# Patient Record
Sex: FEMALE | Race: OTHER | Employment: OTHER | URBAN - METROPOLITAN AREA
[De-identification: names, ages, dates, MRNs, and addresses within clinical notes are randomized per-mention and may not be internally consistent; named-entity substitution may affect disease eponyms.]

---

## 2017-07-18 ENCOUNTER — OFFICE VISIT (OUTPATIENT)
Dept: OBGYN CLINIC | Age: 37
End: 2017-07-18

## 2017-07-18 VITALS
DIASTOLIC BLOOD PRESSURE: 69 MMHG | BODY MASS INDEX: 30.7 KG/M2 | WEIGHT: 191 LBS | HEIGHT: 66 IN | SYSTOLIC BLOOD PRESSURE: 117 MMHG

## 2017-07-18 DIAGNOSIS — Z3A.08 8 WEEKS GESTATION OF PREGNANCY: ICD-10-CM

## 2017-07-18 DIAGNOSIS — O30.001 TWIN GESTATION IN FIRST TRIMESTER, UNSPECIFIED MULTIPLE GESTATION TYPE: ICD-10-CM

## 2017-07-18 DIAGNOSIS — N92.6 MISSED MENSES: ICD-10-CM

## 2017-07-18 DIAGNOSIS — O09.521 AMA (ADVANCED MATERNAL AGE) MULTIGRAVIDA 35+, FIRST TRIMESTER: ICD-10-CM

## 2017-07-18 DIAGNOSIS — Z13.79 GENETIC TESTING: ICD-10-CM

## 2017-07-18 DIAGNOSIS — Z32.01 POSITIVE PREGNANCY TEST: Primary | ICD-10-CM

## 2017-07-18 LAB
ANTIBODY SCREEN, EXTERNAL: NEGATIVE
CHLAMYDIA, EXTERNAL: NEGATIVE
CYSTIC FIBROSIS, EXTERNAL: NEGATIVE
HBSAG, EXTERNAL: NEGATIVE
HCT, EXTERNAL: 40.7
HGB EVAL, EXTERNAL: NEGATIVE
HGB, EXTERNAL: 13.4
HIV, EXTERNAL: NEGATIVE
N. GONORRHEA, EXTERNAL: NEGATIVE
PAP SMEAR, EXTERNAL: NORMAL
PLATELET CNT,   EXTERNAL: 332
RUBELLA, EXTERNAL: NORMAL
T. PALLIDUM, EXTERNAL: NEGATIVE
TYPE, ABO & RH, EXTERNAL: NORMAL
URINALYSIS, EXTERNAL: NEGATIVE

## 2017-07-18 RX ORDER — FOLIC ACID 1 MG/1
2 TABLET ORAL DAILY
Qty: 60 TAB | Refills: 6 | Status: SHIPPED | OUTPATIENT
Start: 2017-07-18 | End: 2017-08-08 | Stop reason: SDUPTHER

## 2017-07-18 RX ORDER — ONDANSETRON 4 MG/1
4 TABLET, ORALLY DISINTEGRATING ORAL
Qty: 30 TAB | Refills: 1 | Status: SHIPPED | OUTPATIENT
Start: 2017-07-18 | End: 2017-08-22

## 2017-07-18 RX ORDER — PROMETHAZINE HYDROCHLORIDE 25 MG/1
25 TABLET ORAL
Qty: 30 TAB | Refills: 0 | Status: SHIPPED | OUTPATIENT
Start: 2017-07-18 | End: 2017-09-19

## 2017-07-18 RX ORDER — DOXYLAMINE SUCCINATE AND PYRIDOXINE HYDROCHLORIDE, DELAYED RELEASE TABLETS 10 MG/10 MG 10; 10 MG/1; MG/1
1 TABLET, DELAYED RELEASE ORAL 3 TIMES DAILY
Qty: 120 TAB | Refills: 2 | Status: SHIPPED | OUTPATIENT
Start: 2017-07-18 | End: 2017-08-22

## 2017-07-18 NOTE — MR AVS SNAPSHOT
Visit Information Date & Time Provider Department Dept. Phone Encounter #  
 7/18/2017  9:00  S Milton Garcia, Dede Bridges [phone] [id number] Upcoming Health Maintenance Date Due  
 PAP AKA CERVICAL CYTOLOGY 5/18/2000 INFLUENZA AGE 9 TO ADULT 8/1/2017 Allergies as of 7/18/2017  Review Complete On: 7/18/2017 By: Melchor Castaneda No Known Allergies Current Immunizations  Never Reviewed No immunizations on file. Not reviewed this visit You Were Diagnosed With   
  
 Codes Comments Positive pregnancy test    -  Primary ICD-10-CM: Z32.01 
ICD-9-CM: V72.42 Missed menses     ICD-10-CM: N92.6 ICD-9-CM: 626.4 AMA (advanced maternal age) multigravida 33+, first trimester     ICD-10-CM: O09.521 ICD-9-CM: 535.25 Genetic testing     ICD-10-CM: Z13.79 ICD-9-CM: V82.79 Vitals BP Height(growth percentile) Weight(growth percentile) LMP BMI OB Status 117/69 5' 6\" (1.676 m) 191 lb (86.6 kg) 05/20/2017 (Approximate) 30.83 kg/m2 Pregnant Smoking Status Former Smoker BMI and BSA Data Body Mass Index Body Surface Area  
 30.83 kg/m 2 2.01 m 2 Your Updated Medication List  
  
   
This list is accurate as of: 7/18/17  9:51 AM.  Always use your most recent med list.  
  
  
  
  
 PRENATAL DHA+COMPLETE PRENATAL -300 mg-mcg-mg Cmpk Generic drug:  [id number] zakiya-folic-dha Take  by mouth. Patient Instructions Learning About Twin Pregnancy What is different about a twin pregnancy? In many ways, a twin pregnancy is like a single-baby pregnancy. Healthy twins develop like a single baby does until the last trimester, when their growth slows down. Twins are usually born before the usual 40-week due date. For the mother, carrying twins can be more difficult than carrying a single baby. And her risks are higher for pregnancy problems.  That's why keeping up with prenatal checks and tests is especially important. How do twins grow? Twins develop from embryos to babies like a single baby does. · By weeks 10 to 14 of your pregnancy, one or two placentas have formed inside your uterus. It is possible to hear your babies' heartbeats with a special ultrasound device. Your babies' eyes can move. Their arms and legs can bend. · Around weeks 14 to 18, hair is beginning to grow on your babies' heads. · By 18 to 22 weeks, your babies can now suck their thumbs and  firmly with their hands. They can open and close their eyelids. Around this time, you may start to feel your babies move. At first, this might feel like fluttering or \"butterflies. \" 
· Around week 26, you may notice that your babies respond to the sound of your or your partner's voice. You may also notice that they do less turning and twisting and more squirming. · Up to 32 weeks, twins grow rapidly. By this point, they really start to look like babies, with hair and plump skin. · Around 32 to 34 weeks, twins' pace of growth slows down. Their lungs become more ready for breathing. This marks a stage when babies born early are less likely to have breathing problems after birth. What can you expect during a twin pregnancy? With a twin pregnancy, your body makes high levels of pregnancy hormones. So morning sickness may come on earlier and stronger than if you were carrying a single baby. You may also have earlier and more intense symptoms from pregnancy, like swelling, heartburn, leg cramps, bladder discomfort, and sleep problems. Your belly may grow bigger, and you may gain more weight, sooner. Talk to your doctor about how much you might expect to gain. When you're carrying twins, you and your babies may be tested and checked more than you would for a single-baby pregnancy.  
· At about 10 weeks, an ultrasound can show if the babies are growing in the same amniotic sac. If they each have their own sac and their own placenta, twins have the best chances of both growing well. · Between weeks 18 and 20, an ultrasound may be able to show the sex of your babies. · Later in your pregnancy, you will start to have checkups more often. This will be sooner than for a single-baby pregnancy. Twins tend to be born early, but 37 weeks is a goal when mother and babies are doing well. As you get closer to delivery time, your medical team will help you know what to expect and what to do. As questions come to mind, keep a list so you can remember to ask your doctor. Follow-up care is a key part of your treatment and safety. Be sure to make and go to all appointments, and call your doctor if you are having problems. It's also a good idea to know your test results and keep a list of the medicines you take. Where can you learn more? Go to http://kassidy-yinka.info/. Enter H410 in the search box to learn more about \"Learning About Twin Pregnancy. \" Current as of: May 30, 2016 Content Version: 11.3 © 8785-4130 [employer]. Care instructions adapted under license by [employer] (which disclaims liability or warranty for this information). If you have questions about a medical condition or this instruction, always ask your healthcare professional. [place] [address] any warranty or liability for your use of this information. Advanced Maternal Age: Care Instructions Your Care Instructions Advanced maternal age is the medical term for pregnancy in a woman who will be 28 or older on her due date. Most women this age have healthy babies. But a pregnancy at this age has a greater risk for problems than a pregnancy at a younger age. These include  birth and preeclampsia. They also include gestational diabetes, problems with the placenta, and genetic problems. Most of these problems can't be prevented. But it's best to catch any problems early. This is why your doctor will want to check you for diabetes. He or she will also check your blood pressure and urine at every visit. High blood pressure and protein in urine are signs of preeclampsia. You can decide if you want to have tests to find out if your fetus has certain genetic problems, such as Down syndrome. A fetus is the medical term for a baby before birth. There are many things you can't control about pregnancy. But there is a lot you can do to help you have a healthy pregnancy. Do your best to eat well. And try to get plenty of exercise and rest. 
Follow-up care is a key part of your treatment and safety. Be sure to make and go to all appointments, and call your doctor if you are having problems. It's also a good idea to know your test results and keep a list of the medicines you take. How can you care for yourself at home? · Talk with your doctor about prenatal screening tests. These can help find Down syndrome and other possible problems. · Eat a balanced diet with plenty of protein, calcium, and iron. Be sure to include fruits, vegetables, and whole grains. · Talk to your doctor about an exercise plan. Many pregnant women enjoy walking, swimming, and prenatal yoga. · Make sure that you get enough folic acid. Folic acid helps prevent some birth defects, especially if you take it before you get pregnant. Your doctor will tell you how much you need. You can take folic acid pills. · Take your prenatal vitamins. · Drink plenty of fluids, enough so that your urine is light yellow or clear like water. Dehydration can lead to contractions. If you have kidney, heart, or liver disease and have to limit fluids, talk with your doctor before you increase the amount of fluids you drink.  
· Check with your doctor or pharmacist before you take any over-the-counter medicines, vitamins, herbal supplements, or home remedies. · Do not drink alcohol. No amount of alcohol has been found to be safe during pregnancy. · Do not smoke. If you need help quitting, talk to your doctor about stop-smoking programs and medicines. These can increase your chances of quitting for good. When should you call for help? Call 911 anytime you think you may need emergency care. For example, call if: 
· You passed out (lost consciousness). · You have sudden, severe pain in your belly. · You have had fluid gushing or leaking from your vagina and you know or think the umbilical cord is bulging into your vagina. If this happens, immediately get down on your knees so your rear end (buttocks) is higher than your head. This will decrease the pressure on the cord until help arrives. Call your doctor now or seek immediate medical care if: 
· You have signs of preeclampsia, such as: 
¨ Sudden swelling of your face, hands, or feet. ¨ New vision problems (such as dimness or blurring). ¨ A severe headache. · You have any vaginal bleeding. · You have belly pain or cramping. · You have a fever. · You have had regular contractions (with or without pain) for an hour. This means that you have 8 or more within 1 hour or 4 or more in 20 minutes after you change your position and drink fluids. · You have a sudden release of fluid from the vagina. · You have low back pain or pelvic pressure that does not go away. · You notice that your baby has stopped moving or is moving much less than normal. 
· You have vaginal discharge that smells bad. · You have severe vomiting with pain or fever, you vomit 3 or more times a day, or you vomit for more than 1 hour each day. Watch closely for changes in your health, and be sure to contact your doctor if you have any problems. Where can you learn more? Go to http://kassidy-yinka.info/. Enter C333 in the search box to learn more about \"Advanced Maternal Age: Care Instructions. \" 
 Current as of: June 14, 2016 Content Version: 11.3 © 5036-1919 [employer]. Care instructions adapted under license by [employer] (which disclaims liability or warranty for this information). If you have questions about a medical condition or this instruction, always ask your healthcare professional. [address] any warranty or liability for your use of this information. Introducing [facility]! Flaca Traylor introduces [employer] patient portal. Now you can access parts of your medical record, email your doctor's office, and request medication refills online. 1. In your internet browser, go to [email or web address] 2. Click on the First Time User? Click Here link in the Sign In box. You will see the New Member Sign Up page. 3. Enter your [employer] Access Code exactly as it appears below. You will not need to use this code after youve completed the sign-up process. If you do not sign up before the expiration date, you must request a new code. · [employer] Access Code: [email or web address] Expires: 10/16/2017  9:51 AM 
 
4. Enter the last four digits of your Social Security Number (xxxx) and Date of Birth (mm/dd/yyyy) as indicated and click Submit. You will be taken to the next sign-up page. 5. Create a [employer] ID. This will be your [employer] login ID and cannot be changed, so think of one that is secure and easy to remember. 6. Create a [employer] password. You can change your password at any time. 7. Enter your Password Reset Question and Answer. This can be used at a later time if you forget your password. 8. Enter your e-mail address. You will receive e-mail notification when new information is available in [address] 9. Click Sign Up. You can now view and download portions of your medical record. 10. Click the Download Summary menu link to download a portable copy of your medical information. If you have questions, please visit the Frequently Asked Questions section of the [employer] website. Remember, [employer] is NOT to be used for urgent needs. For medical emergencies, dial 911. Now available from your iPhone and Android! Please provide this summary of care documentation to your next provider. If you have any questions after today's visit, please call [phone].

## 2017-07-18 NOTE — PROGRESS NOTES
Current pregnancy history: (New Patient)     Iraida Sandhu is a G2 [id number],  44 y.o. female OTHER Patient's last menstrual period was 05/20/2017 (approximate). She presents for the evaluation of amenorrhea and a positive pregnancy test. Reports using Plan B on 6/3/18, day after having intercourse. LMP history:  The date of her LMP is certain. Her last menstrual period was normal and lasted for 4 to 5 days. A urine pregnancy test was positive on 6/19/17. She was not on the pill at conception, but did take Plan B day after IC. Based on her LMP, her EDC is 2/14/18 and her EGA is 8 weeks,3 days. Her menstrual cycles are regular and occur approximately every 28 days  and range from 3 to 5 days. The last menses lasted 7 the usual number of days. Ultrasound data:  She had an  ultrasound done by the ultrasound tech today which revealed a viable twin pregnancy with a gestational age of 11 weeks and 2 days giving an [address] of 2/25/18. Fetus: A  TA ULTRASOUND PERFORMED. A VIABLE TWIN 8W3D IUP IS SEEN WITH NORMAL CARDIAC RHYTHM ON AAA AND BBB. AMNIONICITY  AND CHORONICITY ARE UNSURE. MONO/MONO ARE A POSSIBILITY WITH ONE YS VISUALIZED. GESTATIONAL AGE BASED ON TODAYS LMP. A N[facility]AL [facility] IS SE[facility]. RIGHT & LEFT ADNEXA APPEAR WITHIN NORMAL LIMITS. NO FREE FLUID SEEN IN THE CDS. Fetus: B  TA ULTRASOUND PERFORMED. A VIABLE TWIN 8W5D IUP IS SEEN WITH NORMAL CARDIAC RHYTHM. GESTATIONAL AGE BASED ON TODAYS US.  A NORMAL APPEARING [facility] IS SEEN. RIGHT & LEFT OVARIES APPEAR WITHIN NORMAL LIMITS. NO FREE FLUID SEEN IN THE CDS. Pregnancy symptoms:    Since her LMP she has experienced  urinary frequency, breast tenderness, and nausea. She has been vomiting over the last few weeks. Associated signs and symptoms which she denies: dysuria, discharge, vaginal bleeding. She states she has gained weight:  Approximately 6 pounds over the last few weeks.     Relevant past pregnancy history:   She has the following pregnancy history:  1st pregnancy - placenta previa, delivered by C/S @ 36-37wks. 6#12oz  2nd pregnancy - breech, delivered by C/S @ 37wks. Started bleeding a week prior to delivery. 3rd pregnancy - SAB ~10wks. Did not require D&C. Different FOB. Relevant past medical history:(relevant to this pregnancy): noncontributory. Pap/Occupational history:  Last pap smear: 2015 Results: Normal      Her occupation is: [employer] in [employer]. Substance history: negative for alcohol, tobacco and street drugs. Positive for nothing. Exposure history: There is/are no indoor cats in the home. The patient was instructed to not change the cat litter. She denies close contact with children on a regular basis other than her own. She has had chicken pox or the vaccine in the past.   Patient denies issues with domestic violence. Genetic Screening/Teratology Counseling: (Includes patient, baby's father, or anyone in either family with:)  3.  Patient's age >/= 28 at [facility]?-- 43 yo at delivery  2. Thalassemia ([facility], [facility], [address] [facility], or  background): MCV<80?--no.     3.  Neural tube defect (meningomyelocele, spina bifida, anencephaly)?--no.   4.  Congenital heart defect?--no.  5.  Down syndrome?--no.   6.  Dany-Sachs (Anglican, [place])?--no.   7.  Canavan's Disease?--no.   8.  Familial Dysautonomia?--no.   9.  Sickle cell disease or trait ()? --no   The patient has not been tested for sickle trait  10. Hemophilia or other blood disorders?--no. 11.  Muscular dystrophy?--no. 12.  Cystic fibrosis?--no. 13.  Hunt's Chorea?--no. 14.  Mental retardation/autism (if yes was person tested for Fragile X)?--no. 15.  Other inherited genetic or chromosomal disorder?--no. 12.  Maternal metabolic disorder (DM, PKU, etc)?--no. 17.  Patient or FOB with a child with a birth defect not listed above?--no.  17a.   Patient or FOB with a birth defect themselves?--no. 18.  Recurrent pregnancy loss, or stillbirth?--no. 19.  Any medications since LMP other than prenatal vitamins (include vitamins, supplements, OTC meds, drugs, alcohol)? -- EtOH. [address]  20.  Any other genetic/environmental exposure to discuss?--no. Infection History:  1. Lives with someone with TB or TB exposed?--no.   2.  Patient or partner has history of genital herpes?--no.  3.  Rash or viral illness since LMP?--no.    4.  History of STD (GC, CT, HPV, syphilis, HIV)?-- h/o chlamydia    5. Other: OTHER? Past Medical History:   Diagnosis Date    Abnormal Pap smear of cervix     Only one abn. pap hx. Colpo performed and reports WNL     Hx of chlamydia infection     Routine Papanicolaou smear 2015    WNL per pt. Past Surgical History:   Procedure Laterality Date    HX  SECTION      x 2    HX WISDOM TEETH EXTRACTION       Social History     Occupational History    Not on file. Social History Main Topics    Smoking status: Former Smoker     Quit date:     Smokeless tobacco: Never Used      Comment: Never used vapor or e-cigs     Alcohol use No    Drug use: No    Sexual activity: Yes     Partners: Male     Birth control/ protection: None     Family History   Problem Relation Age of Onset    Liver Disease Father      OB History    Para Term  AB Living   4 2 1 1 1 2   SAB TAB Ectopic Molar Multiple Live Births   1     2      # Outcome Date GA Lbr [place]/2nd Weight Sex Delivery Anes PTL Lv   4 Current            3 SAB 10/2013 12w0d    SAB      2 Term 13 37w0d  8 lb (3.629 kg) M CS-Unspec Spinal N JOSÉ MIGUEL      Birth Comments: Breech    1  11/02/10 36w0d  6 lb (2.722 kg) M CS-Unspec Spinal Y JOSÉ MIGUEL      Complications: Placenta previa        No Known Allergies  Prior to Admission medications    Medication Sig Start Date End Date Taking?  Authorizing Provider   [id number] zakiya-folic-dha (PRENATAL DHA+COMPLETE PRENATAL) -246 mg-mcg-mg cmpk Take  by mouth.    Yes Historical Provider        Review of Systems: History obtained from the patient  Constitutional: negative for weight loss, fever, night sweats  HEENT: negative for hearing loss, earache, congestion, snoring, sore throat  CV: negative for chest pain, palpitations, edema  Resp: negative for cough, shortness of breath, wheezing  Breast: negative for breast lumps, nipple discharge, galactorrhea  GI: negative for change in bowel habits, abdominal pain, black or bloody stools  : negative for dysuria, hematuria, vaginal discharge  MSK: negative for back pain, joint pain, muscle pain  Skin: negative for itching, rash, hives  Neuro: negative for dizziness, headache, confusion, weakness  Psych: negative for anxiety, depression, change in mood  Heme/lymph: negative for bleeding, bruising, pallor    Objective:  Visit Vitals    /69    Ht 5' 6\" (1.676 m)    Wt 191 lb (86.6 kg)    LMP 05/20/2017 (Approximate)    BMI 30.83 kg/m2       Physical Exam:     Constitutional  · Appearance: well-nourished, well developed, alert, in no acute distress    HENT  · Head  · Face: appears normal  · Eyes: appear normal  · Ears: normal  · Mouth: normal  · Lips: no lesions    Neck  · Inspection/Palpation: normal appearance, no masses or tenderness  · Lymph Nodes: no lymphadenopathy present  · Thyroid: gland size normal, nontender, no nodules or masses present on palpation    Chest  · Respiratory Effort: breathing unlabored  · Auscultation: normal breath sounds    Cardiovascular  · Heart:  · Auscultation: regular rate and rhythm without murmur    Breasts  · Inspection of Breasts: breasts symmetrical, no skin changes, no discharge present, nipple appearance normal, no skin retraction present  · Palpation of Breasts and Axillae: no masses present on palpation, no breast tenderness  · Axillary Lymph Nodes: no lymphadenopathy present    Gastrointestinal  · Abdominal Examination: abdomen non-tender to palpation, normal bowel sounds, no masses present  · Liver and spleen: no hepatomegaly present, spleen not palpable  · Hernias: no hernias identified    Genitourinary  · External Genitalia: normal appearance for age, no discharge present, no tenderness present, no inflammatory lesions present, no masses present, no atrophy present  · Vagina: normal vaginal vault without central or paravaginal defects, small amount yellow white discharge present, no inflammatory lesions present, no masses present  · Bladder: non-tender to palpation  · Urethra: appears normal  · Cervix: friable to pap spatula, o/w nl, closed   · Uterus: enlarged 10-12wks, normal shape, soft  · Adnexa: no adnexal tenderness present, no adnexal masses present  · Perineum: perineum within normal limits, no evidence of trauma, no rashes or skin lesions present  · Anus: anus within normal limits, no hemorrhoids present  · Inguinal Lymph Nodes: no lymphadenopathy present    Skin  · General Inspection: no rash, no lesions identified    Neurologic/Psychiatric  · Mental Status:  · Orientation: grossly oriented to person, place and time  · Mood and Affect: mood normal, affect appropriate    Assessment:   Intrauterine pregnancy:  - twin gestation, ?mono-/mono-  ?  -> will refer to MFM for chorionicity  - friable cervix -> NuSwab plus  - rec folate 2mg daily  - nausea - RX printed for Phenergan, Zofran, Diclegis  - prev C/S x2  - h/o placenta previa  - AMA -> MFM    Plan:     · Offered CF testing, CVS, Nuchal Translucency, MSAFP, amnio  · Course of pregnancy discussed including visit schedule, routine U/S, glucola testing, etc.  · Avoid alcoholic beverages and illicit/recreational drugs use  · Take prenatal vitamins or folic acid daily. · Hospital and practice style discussed with coverage system.   · Discussed nutrition, toxoplasmosis precautions, sexual activity, exercise, need for influenza vaccine, environmental and work hazards, travel advice, screen for domestic violence, need for seat belts. · Discussed seafood, unpasteurized dairy products, deli meat, artificial sweeteners, and caffeine. · Information on prenatal classes/breastfeeding given. · Patient encouraged not to smoke. · Discussed current prescription drug use. Given medication list.  · Discussed the use of over the counter medications and chemicals. · Route of delivery discussed, prev C/S x2  · Pt understands risk of hemorrhage during pregnancy and post delivery and would accept blood products if necessary in life-threatening emergencies    Handouts given to pt. Orders Placed This Encounter    CULTURE, URINE    HEMOGLOBIN FRACTIONATION    CT/NG/T.VAGINALIS AMPLIFICATION     Order Specific Question:   Specimen type     Answer:   Vaginal [516]    HEP B SURFACE AG    HIV SCREEN, [facility]. W/REFLEX CONFIRM    CBC W/O DIFF    RUBELLA AB, IGG    T PALLIDUM SCREEN W/REFLEX    URINALYSIS W/ RFLX MICROSCOPIC    CYSTIC FIBROSIS MUTATION 97    REFERRAL TO MATERNAL FETAL MEDICINE     Referral Priority:   Routine     Referral Type:   Consultation     Referral Reason:   Specialty Services Required     Referral Location:   [address] [place] [address]     Referred to Provider:   Chapis Cole MD     Number of Visits Requested:   1    TYPE, ABO & RH    ANTIBODY SCREEN    [id number] zakiya-folic-dha (PRENATAL DHA+COMPLETE PRENATAL) 30975-300 mg-mcg-mg cmpk     Sig: Take  by mouth.  promethazine (PHENERGAN) 25 mg tablet     Sig: Take 1 Tab by mouth every six (6) hours as needed for Nausea. Dispense:  30 Tab     Refill:  0    ondansetron (ZOFRAN ODT) 4 mg disintegrating tablet     Sig: Take 1 Tab by mouth every eight (8) hours as needed for Nausea. Dispense:  30 Tab     Refill:  1    doxylamine-pyridoxine (DICLEGIS) 10-10 mg TbEC     Sig: Take 1 Tab by mouth three (3) times daily.  2 tabs at bedtime, then 1 tab in AM if needed, then 1 tab in afternoon if needed. Max 4 tabs/d     Dispense:  120 Tab     Refill:  2    folic acid (FOLVITE) 1 mg tablet     Sig: Take 2 Tabs by mouth daily. Dispense:  60 Tab     Refill:  6    PAP IG, HPV AND RFX HPV 42/37,24([id number])     Order Specific Question:   Pap Source? Answer:   Cervical and Endocervical     Order Specific Question:   Total Hysterectomy? Answer:   No     Order Specific Question:   Supracervical Hysterectomy? Answer:   No     Order Specific Question:   Post Menopausal?     Answer:   No     Order Specific Question:   Hormone Therapy? Answer:   No     Order Specific Question:   IUD? Answer:   No     Order Specific Question:   Abnormal Bleeding? Answer:   No     Order Specific Question:   Pregnant     Answer:   Yes     Order Specific Question:   Post Partum? Answer:    No

## 2017-07-18 NOTE — PATIENT INSTRUCTIONS
Learning About Twin Pregnancy  What is different about a twin pregnancy? In many ways, a twin pregnancy is like a single-baby pregnancy. Healthy twins develop like a single baby does until the last trimester, when their growth slows down. Twins are usually born before the usual 40-week due date. For the mother, carrying twins can be more difficult than carrying a single baby. And her risks are higher for pregnancy problems. That's why keeping up with prenatal checks and tests is especially important. How do twins grow? Twins develop from embryos to babies like a single baby does. · By weeks 10 to 14 of your pregnancy, one or two placentas have formed inside your uterus. It is possible to hear your babies' heartbeats with a special ultrasound device. Your babies' eyes can move. Their arms and legs can bend. · Around weeks 14 to 18, hair is beginning to grow on your babies' heads. · By 18 to 22 weeks, your babies can now suck their thumbs and  firmly with their hands. They can open and close their eyelids. Around this time, you may start to feel your babies move. At first, this might feel like fluttering or \"butterflies. \"  · Around week 26, you may notice that your babies respond to the sound of your or your partner's voice. You may also notice that they do less turning and twisting and more squirming. · Up to 32 weeks, twins grow rapidly. By this point, they really start to look like babies, with hair and plump skin. · Around 32 to 34 weeks, twins' pace of growth slows down. Their lungs become more ready for breathing. This marks a stage when babies born early are less likely to have breathing problems after birth. What can you expect during a twin pregnancy? With a twin pregnancy, your body makes high levels of pregnancy hormones. So morning sickness may come on earlier and stronger than if you were carrying a single baby.  You may also have earlier and more intense symptoms from pregnancy, like swelling, heartburn, leg cramps, bladder discomfort, and sleep problems. Your belly may grow bigger, and you may gain more weight, sooner. Talk to your doctor about how much you might expect to gain. When you're carrying twins, you and your babies may be tested and checked more than you would for a single-baby pregnancy. · At about 10 weeks, an ultrasound can show if the babies are growing in the same amniotic sac. If they each have their own sac and their own placenta, twins have the best chances of both growing well. · Between weeks 18 and 20, an ultrasound may be able to show the sex of your babies. · Later in your pregnancy, you will start to have checkups more often. This will be sooner than for a single-baby pregnancy. Twins tend to be born early, but 37 weeks is a goal when mother and babies are doing well. As you get closer to delivery time, your medical team will help you know what to expect and what to do. As questions come to mind, keep a list so you can remember to ask your doctor. Follow-up care is a key part of your treatment and safety. Be sure to make and go to all appointments, and call your doctor if you are having problems. It's also a good idea to know your test results and keep a list of the medicines you take. Where can you learn more? Go to http://kassidy-yinka.info/. Enter R101 in the search box to learn more about \"Learning About Twin Pregnancy. \"  Current as of: May 30, 2016  Content Version: 11.3  © 7419-9949 [employer]. Care instructions adapted under license by [employer] (which disclaims liability or warranty for this information). If you have questions about a medical condition or this instruction, always ask your healthcare professional. [place] [address] any warranty or liability for your use of this information.        Advanced Maternal Age: Care Instructions  Your Care Instructions  Advanced maternal age is the medical term for pregnancy in a woman who will be 28 or older on her due date. Most women this age have healthy babies. But a pregnancy at this age has a greater risk for problems than a pregnancy at a younger age. These include  birth and preeclampsia. They also include gestational diabetes, problems with the placenta, and genetic problems. Most of these problems can't be prevented. But it's best to catch any problems early. This is why your doctor will want to check you for diabetes. He or she will also check your blood pressure and urine at every visit. High blood pressure and protein in urine are signs of preeclampsia. You can decide if you want to have tests to find out if your fetus has certain genetic problems, such as Down syndrome. A fetus is the medical term for a baby before birth. There are many things you can't control about pregnancy. But there is a lot you can do to help you have a healthy pregnancy. Do your best to eat well. And try to get plenty of exercise and rest.  Follow-up care is a key part of your treatment and safety. Be sure to make and go to all appointments, and call your doctor if you are having problems. It's also a good idea to know your test results and keep a list of the medicines you take. How can you care for yourself at home? · Talk with your doctor about prenatal screening tests. These can help find Down syndrome and other possible problems. · Eat a balanced diet with plenty of protein, calcium, and iron. Be sure to include fruits, vegetables, and whole grains. · Talk to your doctor about an exercise plan. Many pregnant women enjoy walking, swimming, and prenatal yoga. · Make sure that you get enough folic acid. Folic acid helps prevent some birth defects, especially if you take it before you get pregnant. Your doctor will tell you how much you need. You can take folic acid pills. · Take your prenatal vitamins.   · Drink plenty of fluids, enough so that your urine is light yellow or clear like water. Dehydration can lead to contractions. If you have kidney, heart, or liver disease and have to limit fluids, talk with your doctor before you increase the amount of fluids you drink. · Check with your doctor or pharmacist before you take any over-the-counter medicines, vitamins, herbal supplements, or home remedies. · Do not drink alcohol. No amount of alcohol has been found to be safe during pregnancy. · Do not smoke. If you need help quitting, talk to your doctor about stop-smoking programs and medicines. These can increase your chances of quitting for good. When should you call for help? Call 911 anytime you think you may need emergency care. For example, call if:  · You passed out (lost consciousness). · You have sudden, severe pain in your belly. · You have had fluid gushing or leaking from your vagina and you know or think the umbilical cord is bulging into your vagina. If this happens, immediately get down on your knees so your rear end (buttocks) is higher than your head. This will decrease the pressure on the cord until help arrives. Call your doctor now or seek immediate medical care if:  · You have signs of preeclampsia, such as:  ¨ Sudden swelling of your face, hands, or feet. ¨ New vision problems (such as dimness or blurring). ¨ A severe headache. · You have any vaginal bleeding. · You have belly pain or cramping. · You have a fever. · You have had regular contractions (with or without pain) for an hour. This means that you have 8 or more within 1 hour or 4 or more in 20 minutes after you change your position and drink fluids. · You have a sudden release of fluid from the vagina. · You have low back pain or pelvic pressure that does not go away. · You notice that your baby has stopped moving or is moving much less than normal.  · You have vaginal discharge that smells bad.   · You have severe vomiting with pain or fever, you vomit 3 or more times a day, or you vomit for more than 1 hour each day. Watch closely for changes in your health, and be sure to contact your doctor if you have any problems. Where can you learn more? Go to http://kassidy-yinka.info/. Enter C333 in the search box to learn more about \"Advanced Maternal Age: Care Instructions. \"  Current as of: June 14, 2016  Content Version: 11.3  © 9040-3377 [employer]. Care instructions adapted under license by [employer] (which disclaims liability or warranty for this information). If you have questions about a medical condition or this instruction, always ask your healthcare professional. [address] any warranty or liability for your use of this information.

## 2017-07-19 ENCOUNTER — TELEPHONE (OUTPATIENT)
Dept: OBGYN CLINIC | Age: 37
End: 2017-07-19

## 2017-07-19 LAB — BACTERIA UR CULT: NO GROWTH

## 2017-07-19 NOTE — TELEPHONE ENCOUNTER
----- Message from Suzy Dawson MD sent at 7/18/2017  3:22 PM EDT -----  Regarding: nuswab plus  Please change to nuswab plus.     Thanks!  (sorry)

## 2017-07-19 NOTE — TELEPHONE ENCOUNTER
8w4d patient returning call back. Stated someone called her but her signal is terrible.  Please call patient back at work at [phone]

## 2017-07-19 NOTE — TELEPHONE ENCOUNTER
Added BV and Yeast ([id number]) to GC/C Cx. They will fax an [facility] form for signature.

## 2017-07-21 LAB
APPEARANCE UR: ABNORMAL
BILIRUB UR QL STRIP: NEGATIVE
C TRACH RRNA SPEC QL NAA+PROBE: NEGATIVE
COLOR UR: YELLOW
GLUCOSE UR QL: NEGATIVE
HGB UR QL STRIP: NEGATIVE
KETONES UR QL STRIP: NEGATIVE
LEUKOCYTE ESTERASE UR QL STRIP: NEGATIVE
MICRO URNS: ABNORMAL
N GONORRHOEA RRNA SPEC QL NAA+PROBE: NEGATIVE
NITRITE UR QL STRIP: NEGATIVE
PH UR STRIP: 7 [PH] (ref 5–7.5)
PROT UR QL STRIP: NEGATIVE
SP GR UR: 1.01 (ref 1–1.03)
T VAGINALIS RRNA SPEC QL NAA+PROBE: NEGATIVE
UROBILINOGEN UR STRIP-MCNC: 0.2 MG/DL (ref 0.2–1)

## 2017-07-22 LAB
CYTOLOGIST CVX/VAG CYTO: ABNORMAL
CYTOLOGY CVX/VAG DOC THIN PREP: ABNORMAL
DX ICD CODE: ABNORMAL
HPV I/H RISK 1 DNA CVX QL PROBE+SIG AMP: POSITIVE
HPV16 DNA CVX QL PROBE+SIG AMP: NEGATIVE
HPV18 DNA CVX QL PROBE+SIG AMP: NEGATIVE
Lab: ABNORMAL
OTHER STN SPEC: ABNORMAL
PATH REPORT.FINAL DX SPEC: ABNORMAL
STAT OF ADQ CVX/VAG CYTO-IMP: ABNORMAL

## 2017-07-24 PROBLEM — R87.619 ABNORMAL PAP SMEAR OF CERVIX: Status: ACTIVE | Noted: 2017-07-24

## 2017-07-24 PROBLEM — Z34.90 PREGNANCY: Status: ACTIVE | Noted: 2017-07-24

## 2017-07-25 LAB
A VAGINAE DNA VAG QL NAA+PROBE: ABNORMAL SCORE
ABO GROUP BLD: NORMAL
BLD GP AB SCN SERPL QL: NEGATIVE
BVAB2 DNA VAG QL NAA+PROBE: ABNORMAL SCORE
C ALBICANS DNA VAG QL NAA+PROBE: NEGATIVE
C GLABRATA DNA VAG QL NAA+PROBE: NEGATIVE
CFTR MUT ANL BLD/T: NORMAL
ERYTHROCYTE [DISTWIDTH] IN BLOOD BY AUTOMATED COUNT: 13 % (ref 12.3–15.4)
GENE DIS ANL CARRIER INTERP-IMP: NORMAL
HBV SURFACE AG SERPL QL IA: NEGATIVE
HCT VFR BLD AUTO: 40.7 % (ref 34–46.6)
HGB A MFR BLD: 97.8 % (ref 94–98)
HGB A2 MFR BLD COLUMN CHROM: 2.2 % (ref 0.7–3.1)
HGB BLD-MCNC: 13.4 G/DL (ref 11.1–15.9)
HGB C MFR BLD: 0 %
HGB F MFR BLD: 0 % (ref 0–2)
HGB FRACT BLD-IMP: NORMAL
HGB S BLD QL SOLY: NEGATIVE
HGB S MFR BLD: 0 %
HIV 1+2 AB+HIV1 P24 AG SERPL QL IA: NON REACTIVE
MCH RBC QN AUTO: 29.5 PG (ref 26.6–33)
MCHC RBC AUTO-ENTMCNC: 32.9 G/DL (ref 31.5–35.7)
MCV RBC AUTO: 90 FL (ref 79–97)
MEGA1 DNA VAG QL NAA+PROBE: ABNORMAL SCORE
PLATELET # BLD AUTO: 332 X10E3/UL (ref 150–379)
RBC # BLD AUTO: 4.55 X10E6/UL (ref 3.77–5.28)
RH BLD: POSITIVE
RUBV IGG SERPL IA-ACNC: 0.92 INDEX
SPECIMEN STATUS REPORT, ROLRST: NORMAL
T PALLIDUM AB SER QL IA: NEGATIVE
WBC # BLD AUTO: 11.3 X10E3/UL (ref 3.4–10.8)

## 2017-07-27 ENCOUNTER — TELEPHONE (OUTPATIENT)
Dept: OBGYN CLINIC | Age: 37
End: 2017-07-27

## 2017-07-27 RX ORDER — CLINDAMYCIN HYDROCHLORIDE 300 MG/1
300 CAPSULE ORAL 2 TIMES DAILY
Qty: 14 CAP | Refills: 0 | Status: SHIPPED | OUTPATIENT
Start: 2017-07-27 | End: 2017-08-03

## 2017-07-27 NOTE — TELEPHONE ENCOUNTER
----- Message from Justyna Ramos MD sent at 7/24/2017 11:56 AM EDT -----  Add to PNL  Pap nl cells, +HPV but neg 16/18 -> pap/HPV 1yr. Please let her know, JARETT ayala.

## 2017-07-27 NOTE — TELEPHONE ENCOUNTER
----- Message from Syd Del Rosario MD sent at 7/25/2017  7:43 AM EDT -----  +BV -> clindamycin 300mg BID x7d (OK to take in early pregnancy)

## 2017-07-27 NOTE — TELEPHONE ENCOUNTER
Patient notified of all lab results and verbalized understanding. Rx pended for triage nurse to sign. Tickled pap for one year.

## 2017-07-27 NOTE — TELEPHONE ENCOUNTER
----- Message from Sis Echavarria MD sent at 7/26/2017  1:27 PM EDT -----  Add to PNL  Rubella equivocal.  Please let her know. Should avoid any potential exposure to [place] ([place] Measles)  (which is rare). Should be vaccinated postpartum. She has probably been vaccinated in the past, and if she was actually exposed would be OK, but still should avoid and get revaccinated after delivery.

## 2017-08-07 ENCOUNTER — TELEPHONE (OUTPATIENT)
Dept: OBGYN CLINIC | Age: 37
End: 2017-08-07

## 2017-08-07 NOTE — TELEPHONE ENCOUNTER
Unable to reach pt. Was wondering is she wanted to return after [facility] appt galo'd at 1:30pm for tomorrow, 8/8/17. She is not eligible for Panorama b/c she has twins.

## 2017-08-08 ENCOUNTER — ROUTINE PRENATAL (OUTPATIENT)
Dept: OBGYN CLINIC | Age: 37
End: 2017-08-08

## 2017-08-08 ENCOUNTER — HOSPITAL ENCOUNTER (OUTPATIENT)
Dept: PERINATAL CARE | Age: 37
Discharge: HOME | End: 2017-08-08
Attending: OBSTETRICS & GYNECOLOGY
Payer: OTHER GOVERNMENT

## 2017-08-08 VITALS
DIASTOLIC BLOOD PRESSURE: 75 MMHG | HEIGHT: 66 IN | BODY MASS INDEX: 31.18 KG/M2 | WEIGHT: 194 LBS | SYSTOLIC BLOOD PRESSURE: 111 MMHG | HEART RATE: 83 BPM

## 2017-08-08 DIAGNOSIS — O30.011 MONOAMNIOTIC AND MONOCHORIONIC TWIN GESTATION IN FIRST TRIMESTER: Primary | ICD-10-CM

## 2017-08-08 DIAGNOSIS — Z3A.11 11 WEEKS GESTATION OF PREGNANCY: ICD-10-CM

## 2017-08-08 PROCEDURE — 76801 OB US < 14 WKS SINGLE FETUS: CPT | Performed by: OBSTETRICS & GYNECOLOGY

## 2017-08-08 PROCEDURE — 76802 OB US < 14 WKS ADDL FETUS: CPT | Performed by: OBSTETRICS & GYNECOLOGY

## 2017-08-08 PROCEDURE — 76814 OB US NUCHAL MEAS ADD-ON: CPT | Performed by: OBSTETRICS & GYNECOLOGY

## 2017-08-08 PROCEDURE — 76813 OB US NUCHAL MEAS 1 GEST: CPT | Performed by: OBSTETRICS & GYNECOLOGY

## 2017-08-08 RX ORDER — FOLIC ACID 1 MG/1
2 TABLET ORAL DAILY
Qty: 60 TAB | Refills: 6 | Status: SHIPPED | OUTPATIENT
Start: 2017-08-08

## 2017-08-08 NOTE — PROGRESS NOTES
Saw EUSEBIO. Mono/mono per pt, report pending. Has f/u appt 8/23 to confirm. Has lab slip for Informaseq. Has not started extra folate.

## 2017-08-08 NOTE — PATIENT INSTRUCTIONS

## 2017-08-08 NOTE — MR AVS SNAPSHOT
Visit Information Date & Time Provider Department Dept. Phone Encounter #  
 8/8/2017  2:50  S Milton Garcia, Dede Bridges [phone] [id number] Upcoming Health Maintenance Date Due INFLUENZA AGE 9 TO ADULT 8/1/2017 PAP AKA CERVICAL CYTOLOGY 7/18/2020 Allergies as of 8/8/2017  Review Complete On: 8/8/2017 By: Joseph Robledo No Known Allergies Current Immunizations  Never Reviewed No immunizations on file. Not reviewed this visit Vitals BP Pulse Height(growth percentile) Weight(growth percentile) LMP BMI  
 111/75 83 5' 6\" (1.676 m) 194 lb (88 kg) 05/20/2017 (Approximate) 31.31 kg/m2 OB Status Smoking Status Pregnant Former Smoker Vitals History BMI and BSA Data Body Mass Index Body Surface Area  
 31.31 kg/m 2 2.02 m 2 Preferred Pharmacy Pharmacy Name Phone [facility] [place] [address], [address] Neil Hendricks [phone] Your Updated Medication List  
  
   
This list is accurate as of: 8/8/17  3:50 PM.  Always use your most recent med list.  
  
  
  
  
 doxylamine-pyridoxine 10-10 mg Tbec Commonly known as:  Ethel Hooker Take 1 Tab by mouth three (3) times daily. 2 tabs at bedtime, then 1 tab in AM if needed, then 1 tab in afternoon if needed. Max 4 tabs/d  
  
 folic acid 1 mg tablet Commonly known as:  Google Take 2 Tabs by mouth daily. ondansetron 4 mg disintegrating tablet Commonly known as:  ZOFRAN ODT Take 1 Tab by mouth every eight (8) hours as needed for Nausea. PRENATAL DHA+COMPLETE PRENATAL -300 mg-mcg-mg Cmpk Generic drug:  [id number] zakiya-folic-dha Take  by mouth.  
  
 promethazine 25 mg tablet Commonly known as:  PHENERGAN Take 1 Tab by mouth every six (6) hours as needed for Nausea. Patient Instructions Weeks 10 to 14 of Your Pregnancy: Care Instructions Your Care Instructions By weeks 10 to 15 of your pregnancy, the placenta has formed inside your uterus. It is possible to hear your baby's heartbeat with a special ultrasound device. Your baby's eyes can and do move. The arms and legs can bend. This is a good time to think about testing for birth defects. There are two types of tests: screening and diagnostic. Screening tests show the chance that a baby has a certain birth defect. They can't tell you for sure that your baby has a problem. Diagnostic tests show if a baby has a certain birth defect. It's your choice whether to have these tests. You and your partner can talk to your doctor or midwife about birth defects tests. Follow-up care is a key part of your treatment and safety. Be sure to make and go to all appointments, and call your doctor if you are having problems. It's also a good idea to know your test results and keep a list of the medicines you take. How can you care for yourself at home? Decide about tests · You can have screening tests and diagnostic tests to check for birth defects. The decision to have a test for birth defects is personal. Think about your age, your chance of passing on a family disease, your need to know about any problems, and what you might do after you have the test results. ¨ Triple or quadruple (quad) blood tests. These screening tests can be done between 15 and 20 weeks of pregnancy. They check the amounts of three or four substances in your blood. The doctor looks at these test results, along with your age and other factors, to find out the chance that your baby may have certain problems. ¨ Amniocentesis. This diagnostic test is used to look for chromosomal problems in the baby's cells. It can be done between 15 and 20 weeks of pregnancy, usually around week 16. 
¨ Nuchal translucency test. This test uses ultrasound to measure the thickness of the area at the back of the baby's neck.  An increase in the thickness can be an early sign of Down syndrome. ¨ Chorionic villus sampling (CVS). This is a test that looks for certain genetic problems with your baby. The same genes that are in your baby are in the placenta. A small piece of the placenta is taken out and tested. This test is done when you are 10 to 13 weeks pregnant. Ease discomfort · Slow down and take naps when you feel tired. · If your emotions swing, talk to someone. Crying, anxiety, and concentration problems are common. · If your gums bleed, try a softer toothbrush. If your gums are puffy and bleed a lot, see your dentist. 
· If you feel dizzy: ¨ Get up slowly after sitting or lying down. ¨ Drink plenty of fluids. ¨ Eat small snacks to keep your blood sugar stable. ¨ Put your head between your legs as though you were tying your shoelaces. ¨ Lie down with your legs higher than your head. Use pillows to prop up your feet. · If you have a headache: 
¨ Lie down. ¨ Ask your partner or a good friend for a neck massage. ¨ Try cool cloths over your forehead or across the back of your neck. ¨ Use acetaminophen (Tylenol) for pain relief. Do not use nonsteroidal anti-inflammatory drugs (NSAIDs), such as ibuprofen (Advil, Motrin) or naproxen (Aleve), unless your doctor says it is okay. · If you have a nosebleed, pinch your nose gently, and hold it for a short while. To prevent nosebleeds, try massaging a small dab of petroleum jelly, such as Vaseline, in your nostrils. · If your nose is stuffed up, try saline (saltwater) nose sprays. Do not use decongestant sprays. Care for your breasts · Wear a bra that gives you good support. · Know that changes in your breasts are normal. 
¨ Your breasts may get larger and more tender. Tenderness usually gets better by 12 weeks. ¨ Your nipples may get darker and larger, and small bumps around your nipples may show more. ¨ The veins in your chest and breasts may show more. · Don't worry about \"toughening'\" your nipples. Breastfeeding will naturally do this. Where can you learn more? Go to http://kassidy-yinka.info/. Enter B958 in the search box to learn more about \"Weeks 10 to 14 of Your Pregnancy: Care Instructions. \" Current as of: March 16, 2017 Content Version: 11.3 © 1080-0572 [employer]. Care instructions adapted under license by [employer] (which disclaims liability or warranty for this information). If you have questions about a medical condition or this instruction, always ask your healthcare professional. [place] [address] any warranty or liability for your use of this information. Introducing [facility]! Maribel Reid introduces [facility] patient portal. Now you can access parts of your medical record, email your doctor's office, and request medication refills online. 1. In your internet browser, go to [email or web address] 2. Click on the First Time User? Click Here link in the Sign In box. You will see the New Member Sign Up page. 3. Enter your [facility] Access Code exactly as it appears below. You will not need to use this code after youve completed the sign-up process. If you do not sign up before the expiration date, you must request a new code. · [facility] Access Code: [email or web address] Expires: 10/16/2017  9:51 AM 
 
4. Enter the last four digits of your Social Security Number (xxxx) and Date of Birth (mm/dd/yyyy) as indicated and click Submit. You will be taken to the next sign-up page. 5. Create a Hyphen 8t ID. This will be your [facility] login ID and cannot be changed, so think of one that is secure and easy to remember. 6. Create a [facility] password. You can change your password at any time. 7. Enter your Password Reset Question and Answer. This can be used at a later time if you forget your password. 8. Enter your e-mail address. You will receive e-mail notification when new information is available in [address] 9. Click Sign Up. You can now view and download portions of your medical record. 10. Click the Download Summary menu link to download a portable copy of your medical information. If you have questions, please visit the Frequently Asked Questions section of the [employer] website. Remember, [employer] is NOT to be used for urgent needs. For medical emergencies, dial 911. Now available from your iPhone and Android! Please provide this summary of care documentation to your next provider. Your primary care clinician is listed as NONE. If you have any questions after today's visit, please call [phone].

## 2017-08-15 ENCOUNTER — TELEPHONE (OUTPATIENT)
Dept: PERINATAL CARE | Age: 37
End: 2017-08-15

## 2017-08-21 ENCOUNTER — TELEPHONE (OUTPATIENT)
Dept: OBGYN CLINIC | Age: 37
End: 2017-08-21

## 2017-08-21 NOTE — TELEPHONE ENCOUNTER
Patient calling requesting to have Dr. Emiliana Ortiz nurse call her back.   Patient can be reached at [phone]

## 2017-08-22 ENCOUNTER — TELEPHONE (OUTPATIENT)
Dept: OBGYN CLINIC | Age: 37
End: 2017-08-22

## 2017-08-22 ENCOUNTER — HOSPITAL ENCOUNTER (OUTPATIENT)
Dept: PERINATAL CARE | Age: 37
Discharge: HOME | End: 2017-08-22
Attending: OBSTETRICS & GYNECOLOGY
Payer: OTHER GOVERNMENT

## 2017-08-22 ENCOUNTER — ROUTINE PRENATAL (OUTPATIENT)
Dept: OBGYN CLINIC | Age: 37
End: 2017-08-22

## 2017-08-22 VITALS
HEIGHT: 66 IN | DIASTOLIC BLOOD PRESSURE: 66 MMHG | WEIGHT: 195 LBS | HEART RATE: 88 BPM | SYSTOLIC BLOOD PRESSURE: 110 MMHG | BODY MASS INDEX: 31.34 KG/M2

## 2017-08-22 DIAGNOSIS — O30.011 MONOAMNIOTIC AND MONOCHORIONIC TWIN GESTATION IN FIRST TRIMESTER: Primary | ICD-10-CM

## 2017-08-22 PROCEDURE — 76817 TRANSVAGINAL US OBSTETRIC: CPT | Performed by: OBSTETRICS & GYNECOLOGY

## 2017-08-22 NOTE — PROGRESS NOTES
PNC today (report pending). Note from work ([employer]) for restrictions reviewed. Some SOB with prolonged working. RTO 4wks, MSAFP and MFM same day.

## 2017-08-22 NOTE — TELEPHONE ENCOUNTER
----- Message from Issac Lam MD sent at 8/22/2017  1:30 PM EDT -----  Regarding: Mon 9/18? Would like her to see Adrian Elise and/or Jorge Alberto Auguste. She prefers Mon/Tues appt.

## 2017-08-22 NOTE — PATIENT INSTRUCTIONS

## 2017-08-29 ENCOUNTER — TELEPHONE (OUTPATIENT)
Dept: OBGYN CLINIC | Age: 37
End: 2017-08-29

## 2017-08-29 NOTE — TELEPHONE ENCOUNTER
----- Message from Carlos Rausch MD sent at 8/29/2017  8:25 AM EDT -----  Regarding: [facility] appt  Were you able to get her an appt with Ronna Pink or Camelia Clarke?

## 2017-09-13 ENCOUNTER — PATIENT MESSAGE (OUTPATIENT)
Dept: OBGYN CLINIC | Age: 37
End: 2017-09-13

## 2017-09-14 ENCOUNTER — ROUTINE PRENATAL (OUTPATIENT)
Dept: OBGYN CLINIC | Age: 37
End: 2017-09-14

## 2017-09-14 VITALS
SYSTOLIC BLOOD PRESSURE: 107 MMHG | HEART RATE: 86 BPM | DIASTOLIC BLOOD PRESSURE: 71 MMHG | WEIGHT: 208 LBS | HEIGHT: 66 IN | BODY MASS INDEX: 33.43 KG/M2

## 2017-09-14 DIAGNOSIS — N89.8 VAGINAL ITCHING: ICD-10-CM

## 2017-09-14 DIAGNOSIS — N89.8 VAGINAL DISCHARGE: Primary | ICD-10-CM

## 2017-09-14 LAB
PH BODY FLUID, POCT, PHBFPOCT: 4.5
SOURCE POCT, SRCEPOCT: NORMAL
WET MOUNT POCT, WMPOCT: ABNORMAL

## 2017-09-14 RX ORDER — TERCONAZOLE 8 MG/G
1 CREAM VAGINAL
Qty: 20 G | Refills: 0 | Status: SHIPPED | OUTPATIENT
Start: 2017-09-14 | End: 2017-09-19

## 2017-09-14 NOTE — PROGRESS NOTES
Vaginitis evaluation    Chief Complaint   Vaginitis      HPI  44 y.o. female complains of white vaginal discharge for 4-5 days. Patient's last menstrual period was 2017 (approximate). She has additional symptoms at this time - itching, burning. The patient denies aggravating factors. Would like STD screen. Was on [place] country drive over the weekend, was using baby wipes. Previous treatment included: none    Past Medical History:   Diagnosis Date    Abnormal Pap smear of cervix     Only one abn. pap hx. Colpo performed and reports WNL. No tx required. Nl paps after.  Abnormal Pap smear of cervix 2017    Pap nl cells, +HPV but neg 16/18 -> pap/HPV 1yr.  Hx of chlamydia infection      Past Surgical History:   Procedure Laterality Date    HX  SECTION      x 2    HX WISDOM TEETH EXTRACTION       Social History     Occupational History    Not on file. Social History Main Topics    Smoking status: Former Smoker     Quit date:     Smokeless tobacco: Never Used      Comment: Never used vapor or e-cigs     Alcohol use No    Drug use: No    Sexual activity: Yes     Partners: Male     Birth control/ protection: None     Family History   Problem Relation Age of Onset    Liver Disease Father         No Known Allergies  Prior to Admission medications    Medication Sig Start Date End Date Taking? Authorizing Provider   terconazole (TERAZOL 3) 0.8 % vaginal cream Insert 1 Applicator into vagina nightly. 17  Yes Perla Melgoza MD   folic acid (FOLVITE) 1 mg tablet Take 2 Tabs by mouth daily. 17  Yes Perla Melgoza MD   [id number] zakiya-folic-dha (PRENATAL DHA+COMPLETE PRENATAL) -727 mg-mcg-mg cmpk Take  by mouth. Yes Historical Provider   promethazine (PHENERGAN) 25 mg tablet Take 1 Tab by mouth every six (6) hours as needed for Nausea.  17   Perla Melgoza MD                             Objective:    Visit Vitals    /71    Pulse 86    Ht 5' 6\" (1.676 m)    Wt 208 lb (94.3 kg)    LMP 05/20/2017 (Approximate)    BMI 33.57 kg/m2       Physical Exam:   PHYSICAL EXAMINATION    Constitutional  · Appearance: well-nourished, well developed, alert, in no acute distress    HENT  · Head and Face: appears normal    Genitourinary  · External Genitalia: normal appearance for age, no discharge present, no tenderness present, no inflammatory lesions present, no masses present, no atrophy present  · Vagina:  Small amount white discharge present, otherwise normal vaginal vault without central or paravaginal defects, no inflammatory lesions present, no masses present  · Bladder: non-tender to palpation  · Urethra: appears normal  · Cervix: normal   · Uterus: normal size, shape and consistency  · Adnexa: no adnexal tenderness present, no adnexal masses present  · Perineum: perineum within normal limits, no evidence of trauma, no rashes or skin lesions present  · Anus: anus within normal limits, no hemorrhoids present  · Inguinal Lymph Nodes: no lymphadenopathy present    Skin  · General Inspection: no rash, no lesions identified    Neurologic/Psychiatric  · Mental Status:  · Orientation: grossly oriented to person, place and time  · Mood and Affect: mood normal, affect appropriate        Results for orders placed or performed in visit on 09/14/17   AMB POC SMEAR, STAIN & INTERPRET, WET MOUNT   Result Value Ref Range    Wet mount (POC) +yeast     Narrative    WP/SURESH    Hypae: negative  Buds: negative  Whiff: negative    Wet Prep:  Trich: negative  Clue cells: negative  Hyphae: present  Buds: present  WBC's: normal     AMB POC PH, BODY FLUID EXCEPT BLOOD   Result Value Ref Range    pH,Body fluid (POC) 4.5     Source         Assessment:   Yeast  req STD screen    Plan:   Terazol-3, RX printed  Nuswab GCT    Orders Placed This Encounter    CT/NG/T.VAGINALIS AMPLIFICATION     Order Specific Question:   Specimen type     Answer:   Vaginal [516]    AMB POC SMEAR, STAIN & INTERPRET, WET MOUNT    AMB POC PH, BODY FLUID EXCEPT BLOOD    terconazole (TERAZOL 3) 0.8 % vaginal cream     Sig: Insert 1 Applicator into vagina nightly.      Dispense:  20 g     Refill:  0

## 2017-09-14 NOTE — PATIENT INSTRUCTIONS
Bacterial Vaginosis: Care Instructions  Your Care Instructions    Bacterial vaginosis is a type of vaginal infection. It is caused by excess growth of certain bacteria that are normally found in the vagina. Symptoms can include itching, swelling, pain when you urinate or have sex, and a gray or yellow discharge with a \"fishy\" odor. It is not considered an infection that is spread through sexual contact. Although symptoms can be annoying and uncomfortable, bacterial vaginosis does not usually cause other health problems. However, if you have it while you are pregnant, it can cause complications. While the infection may go away on its own, most doctors use antibiotics to treat it. You may have been prescribed pills or vaginal cream. With treatment, bacterial vaginosis usually clears up in 5 to 7 days. Follow-up care is a key part of your treatment and safety. Be sure to make and go to all appointments, and call your doctor if you are having problems. It's also a good idea to know your test results and keep a list of the medicines you take. How can you care for yourself at home? · Take your antibiotics as directed. Do not stop taking them just because you feel better. You need to take the full course of antibiotics. · Do not eat or drink anything that contains alcohol if you are taking metronidazole (Flagyl). · Keep using your medicine if you start your period. Use pads instead of tampons while using a vaginal cream or suppository. Tampons can absorb the medicine. · Wear loose cotton clothing. Do not wear nylon and other materials that hold body heat and moisture close to the skin. · Do not scratch. Relieve itching with a cold pack or a cool bath. · Do not wash your vaginal area more than once a day. Use plain water or a mild, unscented soap. Do not douche. When should you call for help?   Watch closely for changes in your health, and be sure to contact your doctor if:  · You have unexpected vaginal bleeding. · You have a fever. · You have new or increased pain in your vagina or pelvis. · You are not getting better after 1 week. · Your symptoms return after you finish the course of your medicine. Where can you learn more? Go to http://kassidy-yinka.info/. Royer Chaves in the search box to learn more about \"Bacterial Vaginosis: Care Instructions. \"  Current as of: October 13, 2016  Content Version: 11.3  © 8258-2550 [employer]. Care instructions adapted under license by [employer] (which disclaims liability or warranty for this information). If you have questions about a medical condition or this instruction, always ask your healthcare professional. [address] any warranty or liability for your use of this information.

## 2017-09-16 LAB
C TRACH RRNA SPEC QL NAA+PROBE: NEGATIVE
N GONORRHOEA RRNA SPEC QL NAA+PROBE: NEGATIVE
T VAGINALIS RRNA SPEC QL NAA+PROBE: NEGATIVE

## 2017-09-19 ENCOUNTER — HOSPITAL ENCOUNTER (OUTPATIENT)
Dept: PERINATAL CARE | Age: 37
Discharge: HOME | End: 2017-09-19
Attending: OBSTETRICS & GYNECOLOGY
Payer: OTHER GOVERNMENT

## 2017-09-19 ENCOUNTER — ROUTINE PRENATAL (OUTPATIENT)
Dept: OBGYN CLINIC | Age: 37
End: 2017-09-19

## 2017-09-19 VITALS
BODY MASS INDEX: 32.14 KG/M2 | DIASTOLIC BLOOD PRESSURE: 70 MMHG | SYSTOLIC BLOOD PRESSURE: 107 MMHG | WEIGHT: 200 LBS | HEART RATE: 84 BPM | HEIGHT: 66 IN

## 2017-09-19 DIAGNOSIS — Z3A.17 17 WEEKS GESTATION OF PREGNANCY: ICD-10-CM

## 2017-09-19 DIAGNOSIS — O30.012 MONOAMNIOTIC AND MONOCHORIONIC TWIN GESTATION IN SECOND TRIMESTER: Primary | ICD-10-CM

## 2017-09-19 PROCEDURE — 76816 OB US FOLLOW-UP PER FETUS: CPT | Performed by: OBSTETRICS & GYNECOLOGY

## 2017-09-19 RX ORDER — ASPIRIN 325 MG
TABLET, DELAYED RELEASE (ENTERIC COATED) ORAL
COMMUNITY
Start: 2017-09-14 | End: 2017-09-19

## 2017-10-03 ENCOUNTER — HOSPITAL ENCOUNTER (OUTPATIENT)
Dept: PERINATAL CARE | Age: 37
Discharge: HOME | End: 2017-10-03
Attending: OBSTETRICS & GYNECOLOGY
Payer: OTHER GOVERNMENT

## 2017-10-03 ENCOUNTER — ROUTINE PRENATAL (OUTPATIENT)
Dept: OBGYN CLINIC | Age: 37
End: 2017-10-03

## 2017-10-03 VITALS
HEIGHT: 66 IN | HEART RATE: 90 BPM | WEIGHT: 209 LBS | SYSTOLIC BLOOD PRESSURE: 99 MMHG | BODY MASS INDEX: 33.59 KG/M2 | DIASTOLIC BLOOD PRESSURE: 66 MMHG

## 2017-10-03 DIAGNOSIS — Z3A.19 19 WEEKS GESTATION OF PREGNANCY: ICD-10-CM

## 2017-10-03 DIAGNOSIS — O30.012 MONOAMNIOTIC AND MONOCHORIONIC TWIN GESTATION IN SECOND TRIMESTER: Primary | ICD-10-CM

## 2017-10-03 PROCEDURE — 76815 OB US LIMITED FETUS(S): CPT | Performed by: OBSTETRICS & GYNECOLOGY

## 2017-10-03 RX ORDER — ASPIRIN 81 MG/1
TABLET ORAL
COMMUNITY
Start: 2017-09-19

## 2017-10-03 NOTE — PATIENT INSTRUCTIONS

## 2017-10-03 NOTE — PROGRESS NOTES
+FMx2. Saw MFM today, nl per pt, f/u 2wks. Will do flu vacc at work. RTO 4wks. - twins, mono/mono  - rec folate 2mg daily  - prev C/S x2  - h/o placenta previa  - AMA. Informaseq low risk.  - Rubella equivocal  - ASA  - [facility] US (9/19) 3.8% discordance, no evidence TTT.  Wkly US @26wks; 2x/wk starting @ 28-30wks  - del 33-34wks

## 2017-10-17 ENCOUNTER — ROUTINE PRENATAL (OUTPATIENT)
Dept: OBGYN CLINIC | Age: 37
End: 2017-10-17

## 2017-10-17 ENCOUNTER — HOSPITAL ENCOUNTER (OUTPATIENT)
Dept: PERINATAL CARE | Age: 37
Discharge: HOME | End: 2017-10-17
Attending: OBSTETRICS & GYNECOLOGY
Payer: OTHER GOVERNMENT

## 2017-10-17 VITALS
BODY MASS INDEX: 34.72 KG/M2 | DIASTOLIC BLOOD PRESSURE: 77 MMHG | WEIGHT: 216 LBS | HEART RATE: 94 BPM | SYSTOLIC BLOOD PRESSURE: 116 MMHG | HEIGHT: 66 IN

## 2017-10-17 DIAGNOSIS — O30.012 MONOAMNIOTIC AND MONOCHORIONIC TWIN GESTATION IN SECOND TRIMESTER: Primary | ICD-10-CM

## 2017-10-17 DIAGNOSIS — Z3A.21 21 WEEKS GESTATION OF PREGNANCY: ICD-10-CM

## 2017-10-17 PROCEDURE — 76811 OB US DETAILED SNGL FETUS: CPT | Performed by: OBSTETRICS & GYNECOLOGY

## 2017-10-17 PROCEDURE — 76812 OB US DETAILED ADDL FETUS: CPT | Performed by: OBSTETRICS & GYNECOLOGY

## 2017-10-17 RX ORDER — PREDNISONE 20 MG/1
TABLET ORAL
COMMUNITY
Start: 2017-10-13

## 2017-10-17 RX ORDER — ALBUTEROL SULFATE 0.83 MG/ML
SOLUTION RESPIRATORY (INHALATION)
COMMUNITY
Start: 2017-10-13

## 2017-10-17 RX ORDER — AZITHROMYCIN 250 MG/1
TABLET, FILM COATED ORAL
COMMUNITY
Start: 2017-10-13 | End: 2017-11-27 | Stop reason: ALTCHOICE

## 2017-10-17 NOTE — PATIENT INSTRUCTIONS

## 2017-10-17 NOTE — PROGRESS NOTES
Saw MFM today - wnl, f/u 2wks. Started Zithromax and PO prednisone, will be starting nebulizer - for viral pneumonia. Will defer flu vacc until next visit. RTO 2wks  21+3/21+3 @ 21+3. Transv/transv. No TTT. RTO 2wks.

## 2017-10-31 ENCOUNTER — ROUTINE PRENATAL (OUTPATIENT)
Dept: OBGYN CLINIC | Age: 37
End: 2017-10-31

## 2017-10-31 ENCOUNTER — HOSPITAL ENCOUNTER (OUTPATIENT)
Dept: PERINATAL CARE | Age: 37
Discharge: HOME | End: 2017-10-31
Attending: OBSTETRICS & GYNECOLOGY
Payer: OTHER GOVERNMENT

## 2017-10-31 ENCOUNTER — TELEPHONE (OUTPATIENT)
Dept: OBGYN CLINIC | Age: 37
End: 2017-10-31

## 2017-10-31 VITALS
WEIGHT: 219 LBS | DIASTOLIC BLOOD PRESSURE: 74 MMHG | HEART RATE: 93 BPM | SYSTOLIC BLOOD PRESSURE: 114 MMHG | HEIGHT: 66 IN | BODY MASS INDEX: 35.2 KG/M2

## 2017-10-31 DIAGNOSIS — Z3A.23 23 WEEKS GESTATION OF PREGNANCY: ICD-10-CM

## 2017-10-31 DIAGNOSIS — O30.012 MONOAMNIOTIC AND MONOCHORIONIC TWIN GESTATION IN SECOND TRIMESTER: Primary | ICD-10-CM

## 2017-10-31 PROCEDURE — 76815 OB US LIMITED FETUS(S): CPT | Performed by: OBSTETRICS & GYNECOLOGY

## 2017-10-31 RX ORDER — AMOXICILLIN AND CLAVULANATE POTASSIUM 500; 125 MG/1; MG/1
TABLET, FILM COATED ORAL
COMMUNITY
Start: 2017-10-30 | End: 2017-11-27 | Stop reason: ALTCHOICE

## 2017-10-31 NOTE — PATIENT INSTRUCTIONS
Weeks 22 to 26 of Your Pregnancy: Care Instructions  Your Care Instructions    As you enter your 7th month of pregnancy at week 26, your baby's lungs are growing stronger and getting ready to breathe. You may notice that your baby responds to the sound of your or your partner's voice. You may also notice that your baby does less turning and twisting and more squirming or jerking. Jerking often means that your baby has the hiccups. Hiccups are perfectly normal and are only temporary. You may want to think about attending a childbirth preparation class. This is also a good time to start thinking about whether you want to have pain medicine during labor. Most pregnant women are tested for gestational diabetes between weeks 25 and 28. Gestational diabetes occurs when your blood sugar level gets too high when you're pregnant. The test is important, because you can have gestational diabetes and not know it. But the condition can cause problems for your baby. Follow-up care is a key part of your treatment and safety. Be sure to make and go to all appointments, and call your doctor if you are having problems. It's also a good idea to know your test results and keep a list of the medicines you take. How can you care for yourself at home? Ease discomfort from your baby's kicking  · Change your position. Sometimes this will cause your baby to change position too. · Take a deep breath while you raise your arm over your head. Then breathe out while you drop your arm. Do Kegel exercises to prevent urine from leaking  · You can do Kegel exercises while you stand or sit. ¨ Squeeze the same muscles you would use to stop your urine. Your belly and thighs should not move. ¨ Hold the squeeze for 3 seconds, and then relax for 3 seconds. ¨ Start with 3 seconds. Then add 1 second each week until you are able to squeeze for 10 seconds. ¨ Repeat the exercise 10 to 15 times for each session.  Do three or more sessions each day.  Ease or reduce swelling in your feet, ankles, hands, and fingers  · If your fingers are puffy, take off your rings. · Do not eat high-salt foods, such as potato chips. · Prop up your feet on a stool or couch as much as possible. Sleep with pillows under your feet. · Do not stand for long periods of time or wear tight shoes. · Wear support stockings. Where can you learn more? Go to http://kassidy-yinka.info/. Enter G264 in the search box to learn more about \"Weeks 22 to 26 of Your Pregnancy: Care Instructions. \"  Current as of: March 16, 2017  Content Version: 11.4  © 8158-1344 Healthwise, [employer]. Care instructions adapted under license by [employer] (which disclaims liability or warranty for this information). If you have questions about a medical condition or this instruction, always ask your healthcare professional. [place] [address] any warranty or liability for your use of this information.

## 2017-10-31 NOTE — PROGRESS NOTES
Dx'd w/bronchitis, will be starting prednisone x5d. Saw MFM today, nl per pt, report pending. RTO 2wks with 1hr/CBC.

## 2017-10-31 NOTE — TELEPHONE ENCOUNTER
Call received at 8:33am      44year old  23w3d pregnant with twins. Patient calling regarding her appointments. Patient as appointment for [facility][1] at 9:30 and choose to keep the 11:00am appointment to see Delgado Camargo and the 9:00am appointment was cancelled. Patient verbalized understanding. Dr. Brian Schwab nurse notified.

## 2017-11-14 ENCOUNTER — ROUTINE PRENATAL (OUTPATIENT)
Dept: OBGYN CLINIC | Age: 37
End: 2017-11-14

## 2017-11-14 ENCOUNTER — HOSPITAL ENCOUNTER (OUTPATIENT)
Dept: PERINATAL CARE | Age: 37
Discharge: HOME | End: 2017-11-14
Attending: OBSTETRICS & GYNECOLOGY
Payer: OTHER GOVERNMENT

## 2017-11-14 VITALS
WEIGHT: 225 LBS | SYSTOLIC BLOOD PRESSURE: 108 MMHG | HEIGHT: 66 IN | HEART RATE: 94 BPM | DIASTOLIC BLOOD PRESSURE: 73 MMHG | BODY MASS INDEX: 36.16 KG/M2

## 2017-11-14 DIAGNOSIS — O09.522 AMA (ADVANCED MATERNAL AGE) MULTIGRAVIDA 35+, SECOND TRIMESTER: ICD-10-CM

## 2017-11-14 DIAGNOSIS — Z3A.25 25 WEEKS GESTATION OF PREGNANCY: ICD-10-CM

## 2017-11-14 DIAGNOSIS — Z23 ENCOUNTER FOR IMMUNIZATION: ICD-10-CM

## 2017-11-14 DIAGNOSIS — O30.012 MONOAMNIOTIC AND MONOCHORIONIC TWIN GESTATION IN SECOND TRIMESTER: Primary | ICD-10-CM

## 2017-11-14 LAB
GTT, 1 HR, GLUCOLA, EXTERNAL: 133
HCT, EXTERNAL: 35.8
HGB, EXTERNAL: 12.2
PLATELET CNT,   EXTERNAL: 293

## 2017-11-14 PROCEDURE — 76816 OB US FOLLOW-UP PER FETUS: CPT | Performed by: OBSTETRICS & GYNECOLOGY

## 2017-11-14 NOTE — PROGRESS NOTES
44year old  25w3d pregnant patient given the 0.5ml flu injection as per MD order. MD states ok for patient to have flu injection even though patient completed course of prednisone on 11/10/17. Patient tolerated injection in left deltoid without complications.

## 2017-11-14 NOTE — PROGRESS NOTES
Saw EUSEBIO, US wnl. Completed 5d course of prednisone for bronchitis. Did not complete abx, adv to stop by PCP. Spoke with Dr. Yashira Jordan - he will see her 11/27, discuss outpt vs inpt management. See me same day.

## 2017-11-14 NOTE — PATIENT INSTRUCTIONS
Weeks 22 to 26 of Your Pregnancy: Care Instructions  Your Care Instructions    As you enter your 7th month of pregnancy at week 26, your baby's lungs are growing stronger and getting ready to breathe. You may notice that your baby responds to the sound of your or your partner's voice. You may also notice that your baby does less turning and twisting and more squirming or jerking. Jerking often means that your baby has the hiccups. Hiccups are perfectly normal and are only temporary. You may want to think about attending a childbirth preparation class. This is also a good time to start thinking about whether you want to have pain medicine during labor. Most pregnant women are tested for gestational diabetes between weeks 25 and 28. Gestational diabetes occurs when your blood sugar level gets too high when you're pregnant. The test is important, because you can have gestational diabetes and not know it. But the condition can cause problems for your baby. Follow-up care is a key part of your treatment and safety. Be sure to make and go to all appointments, and call your doctor if you are having problems. It's also a good idea to know your test results and keep a list of the medicines you take. How can you care for yourself at home? Ease discomfort from your baby's kicking  · Change your position. Sometimes this will cause your baby to change position too. · Take a deep breath while you raise your arm over your head. Then breathe out while you drop your arm. Do Kegel exercises to prevent urine from leaking  · You can do Kegel exercises while you stand or sit. ¨ Squeeze the same muscles you would use to stop your urine. Your belly and thighs should not move. ¨ Hold the squeeze for 3 seconds, and then relax for 3 seconds. ¨ Start with 3 seconds. Then add 1 second each week until you are able to squeeze for 10 seconds. ¨ Repeat the exercise 10 to 15 times for each session.  Do three or more sessions each day.  Ease or reduce swelling in your feet, ankles, hands, and fingers  · If your fingers are puffy, take off your rings. · Do not eat high-salt foods, such as potato chips. · Prop up your feet on a stool or couch as much as possible. Sleep with pillows under your feet. · Do not stand for long periods of time or wear tight shoes. · Wear support stockings. Where can you learn more? Go to http://kassidy-ynika.info/. Enter G264 in the search box to learn more about \"Weeks 22 to 26 of Your Pregnancy: Care Instructions. \"  Current as of: March 16, 2017  Content Version: 11.4  © 0488-3251 Healthwise, [employer]. Care instructions adapted under license by [employer] (which disclaims liability or warranty for this information). If you have questions about a medical condition or this instruction, always ask your healthcare professional. [place] [address] any warranty or liability for your use of this information.

## 2017-11-15 ENCOUNTER — PATIENT MESSAGE (OUTPATIENT)
Dept: OBGYN CLINIC | Age: 37
End: 2017-11-15

## 2017-11-15 LAB
ERYTHROCYTE [DISTWIDTH] IN BLOOD BY AUTOMATED COUNT: 13.7 % (ref 12.3–15.4)
GLUCOSE 1H P 50 G GLC PO SERPL-MCNC: 133 MG/DL (ref 65–139)
HCT VFR BLD AUTO: 35.8 % (ref 34–46.6)
HGB BLD-MCNC: 12.2 G/DL (ref 11.1–15.9)
MCH RBC QN AUTO: 29.5 PG (ref 26.6–33)
MCHC RBC AUTO-ENTMCNC: 34.1 G/DL (ref 31.5–35.7)
MCV RBC AUTO: 87 FL (ref 79–97)
PLATELET # BLD AUTO: 293 X10E3/UL (ref 150–379)
RBC # BLD AUTO: 4.13 X10E6/UL (ref 3.77–5.28)
WBC # BLD AUTO: 13.2 X10E3/UL (ref 3.4–10.8)

## 2017-11-15 NOTE — TELEPHONE ENCOUNTER
Spoke with patient and advised as indicated by Dr. Felix Padilla. Patient voiced understanding. Will send work form via NXE.

## 2017-11-27 ENCOUNTER — ROUTINE PRENATAL (OUTPATIENT)
Dept: OBGYN CLINIC | Age: 37
End: 2017-11-27

## 2017-11-27 ENCOUNTER — HOSPITAL ENCOUNTER (OUTPATIENT)
Dept: PERINATAL CARE | Age: 37
Discharge: HOME | End: 2017-11-27
Attending: OBSTETRICS & GYNECOLOGY
Payer: OTHER GOVERNMENT

## 2017-11-27 VITALS
WEIGHT: 230 LBS | HEART RATE: 92 BPM | BODY MASS INDEX: 36.96 KG/M2 | HEIGHT: 66 IN | SYSTOLIC BLOOD PRESSURE: 102 MMHG | DIASTOLIC BLOOD PRESSURE: 73 MMHG

## 2017-11-27 DIAGNOSIS — Z3A.27 27 WEEKS GESTATION OF PREGNANCY: ICD-10-CM

## 2017-11-27 DIAGNOSIS — O30.012 MONOAMNIOTIC AND MONOCHORIONIC TWIN GESTATION IN SECOND TRIMESTER: Primary | ICD-10-CM

## 2017-11-27 PROCEDURE — 76815 OB US LIMITED FETUS(S): CPT | Performed by: OBSTETRICS & GYNECOLOGY

## 2017-11-27 NOTE — LETTER
11/27/2017 To Whom it may concern; 
 
 Masha Ojeda is under my care. She was seen in our office today. Please be advise that we recommend patient to discontinue physical portion of PT activites at this time, but may continue with education portion. Respectfully, 
 
 
 
 
Dr. Poonam Lopez

## 2017-11-27 NOTE — PATIENT INSTRUCTIONS
Weeks 26 to 30 of Your Pregnancy: Care Instructions  Your Care Instructions    You are now in your last trimester of pregnancy. Your baby is growing rapidly. And you'll probably feel your baby moving around more often. Your doctor may ask you to count your baby's kicks. Your back may ache as your body gets used to your baby's size and length. If you haven't already had the Tdap shot during this pregnancy, talk to your doctor about getting it. It will help protect your  against pertussis infection. During this time, it's important to take care of yourself and pay attention to what your body needs. If you feel sexual, explore ways to be close with your partner that match your comfort and desire. Use the tips provided in this care sheet to find ways to be sexual in your own way. Follow-up care is a key part of your treatment and safety. Be sure to make and go to all appointments, and call your doctor if you are having problems. It's also a good idea to know your test results and keep a list of the medicines you take. How can you care for yourself at home? Take it easy at work  · Take frequent breaks. If possible, stop working when you are tired, and rest during your lunch hour. · Take bathroom breaks every 2 hours. · Change positions often. If you sit for long periods, stand up and walk around. · When you stand for a long time, keep one foot on a low stool with your knee bent. After standing a lot, sit with your feet up. · Avoid fumes, chemicals, and tobacco smoke. Be sexual in your own way  · Having sex during pregnancy is okay, unless your doctor tells you not to. · You may be very interested in sex, or you may have no interest at all. · Your growing belly can make it hard to find a good position during intercourse. [place] and explore. · You may get cramps in your uterus when your partner touches your breasts.   · A back rub may relieve the backache or cramps that sometimes follow orgasm. Learn about  labor  · Watch for signs of  labor. You may be going into labor if:  ¨ You have menstrual-like cramps, with or without nausea. ¨ You have about 4 or more contractions in 20 minutes, or about 8 or more within 1 hour, even after you have had a glass of water and are resting. ¨ You have a low, dull backache that does not go away when you change your position. ¨ You have pain or pressure in your pelvis that comes and goes in a pattern. ¨ You have intestinal cramping or flu-like symptoms, with or without diarrhea. ¨ You notice an increase or change in your vaginal discharge. Discharge may be heavy, mucus-like, watery, or streaked with blood. ¨ Your water breaks. · If you think you have  labor:  ¨ Drink 2 or 3 glasses of water or juice. Not drinking enough fluids can cause contractions. ¨ Stop what you are doing, and empty your bladder. Then lie down on your left side for at least 1 hour. ¨ While lying on your side, find your breast bone. Put your fingers in the soft spot just below it. Move your fingers down toward your belly button to find the top of your uterus. Check to see if it is tight. ¨ Contractions can be weak or strong. Record your contractions for an hour. Time a contraction from the start of one contraction to the start of the next one. ¨ Single or several strong contractions without a pattern are called Malcom-Bonilla contractions. They are practice contractions but not the start of labor. They often stop if you change what you are doing. ¨ Call your doctor if you have regular contractions. Where can you learn more? Go to http://kassidy-yinka.info/. Enter P521 in the search box to learn more about \"Weeks 26 to 30 of Your Pregnancy: Care Instructions. \"  Current as of: 2017  Content Version: 11.4  © 5130-0540 [employer].  Care instructions adapted under license by [employer] (which disclaims liability or warranty for this information). If you have questions about a medical condition or this instruction, always ask your healthcare professional. [place] [address] any warranty or liability for your use of this information.

## 2017-11-27 NOTE — LETTER
11/27/2017 To Whom it may concern; 
 
 Isabel Newsome is under my care. She was seen in our office today. Please be advise that we recommend patient to discontinue complete physical activites at this time, but may continue with education portion. Respectfully, 
 
 
 
 
Dr. Marvene Oppenheim

## 2017-11-27 NOTE — PROGRESS NOTES
Cont to c/o SOB, wheezing. Has completed Z-pack then Augmentin x10d, prednisone, nebulizer. Lungs with diffuse Insp and exp wheezing and rhonchi, (exp>insp) -> adv to f/u with PCP, CXR w/abd shield OK. Note given to excuse from physical portion of PT. Saw MFM, report pending. Will start BPP 2x/wk next wk (28wks), then 3x/wk at 30wks. Plan C/S ~33wks, will tentatively schedule for 1/8. RTO 1wk. - twins, mono/mono  - rec folate 2mg daily  - prev C/S x2  - h/o placenta previa  - AMA. Informaseq low risk.  - Rubella equivocal  - ASA  - MFM US (9/19) 3.8% discordance, no evidence TTT. Wkly US @26wks; 2x/wk starting @ 28-30wks  - MF US (10/17) 21+3/21+3 @ 21+3. Transv/transv. No TTT. RTO 2wks.   - del 33-34wks

## 2017-11-30 ENCOUNTER — TELEPHONE (OUTPATIENT)
Dept: OBGYN CLINIC | Age: 37
End: 2017-11-30

## 2017-11-30 ENCOUNTER — PATIENT MESSAGE (OUTPATIENT)
Dept: OBGYN CLINIC | Age: 37
End: 2017-11-30

## 2017-11-30 ENCOUNTER — HOSPITAL ENCOUNTER (OUTPATIENT)
Dept: PERINATAL CARE | Age: 37
Discharge: HOME | End: 2017-11-30
Attending: OBSTETRICS & GYNECOLOGY
Payer: OTHER GOVERNMENT

## 2017-11-30 PROCEDURE — 76815 OB US LIMITED FETUS(S): CPT | Performed by: OBSTETRICS & GYNECOLOGY

## 2017-11-30 NOTE — TELEPHONE ENCOUNTER
Call received at 8:35am      44year old P1 33w9d pregnant with twins,last seen in the office on 17. Patient denies vaginal bleeding, ROM , contractions and reports last feeling the baby move last night around 9:30am    Patient reports drinking orange juice at 5 am and eating breakfast now. Patient reports some recent respiratory symptoms. Patient reports feeling different today. Patient advised to come to [facility] at 9:30am today( [address])  as arranged per Dr. Nadja Morales. Patient is on her way and verbalized understanding.

## 2017-12-04 ENCOUNTER — ROUTINE PRENATAL (OUTPATIENT)
Dept: OBGYN CLINIC | Age: 37
End: 2017-12-04

## 2017-12-04 ENCOUNTER — HOSPITAL ENCOUNTER (OUTPATIENT)
Dept: PERINATAL CARE | Age: 37
Discharge: HOME | End: 2017-12-04
Attending: OBSTETRICS & GYNECOLOGY
Payer: OTHER GOVERNMENT

## 2017-12-04 VITALS
DIASTOLIC BLOOD PRESSURE: 72 MMHG | SYSTOLIC BLOOD PRESSURE: 119 MMHG | WEIGHT: 233 LBS | HEART RATE: 97 BPM | BODY MASS INDEX: 37.45 KG/M2 | HEIGHT: 66 IN

## 2017-12-04 DIAGNOSIS — O09.522 AMA (ADVANCED MATERNAL AGE) MULTIGRAVIDA 35+, SECOND TRIMESTER: ICD-10-CM

## 2017-12-04 DIAGNOSIS — Z23 NEED FOR DIPHTHERIA-TETANUS-PERTUSSIS (TDAP) VACCINE, ADULT/ADOLESCENT: ICD-10-CM

## 2017-12-04 DIAGNOSIS — Z3A.28 28 WEEKS GESTATION OF PREGNANCY: ICD-10-CM

## 2017-12-04 DIAGNOSIS — O30.013 MONOAMNIOTIC AND MONOCHORIONIC TWIN GESTATION IN THIRD TRIMESTER: Primary | ICD-10-CM

## 2017-12-04 PROCEDURE — 76816 OB US FOLLOW-UP PER FETUS: CPT | Performed by: OBSTETRICS & GYNECOLOGY

## 2017-12-04 RX ORDER — AZITHROMYCIN 250 MG/1
TABLET, FILM COATED ORAL
Refills: 0 | COMMUNITY
Start: 2017-12-02

## 2017-12-04 NOTE — PROGRESS NOTES
Patient is here today for Tdap vaccination. 0.5 ml given in left deltoid per doctor order with no complications.

## 2017-12-04 NOTE — PATIENT INSTRUCTIONS
Weeks 26 to 30 of Your Pregnancy: Care Instructions  Your Care Instructions    You are now in your last trimester of pregnancy. Your baby is growing rapidly. And you'll probably feel your baby moving around more often. Your doctor may ask you to count your baby's kicks. Your back may ache as your body gets used to your baby's size and length. If you haven't already had the Tdap shot during this pregnancy, talk to your doctor about getting it. It will help protect your  against pertussis infection. During this time, it's important to take care of yourself and pay attention to what your body needs. If you feel sexual, explore ways to be close with your partner that match your comfort and desire. Use the tips provided in this care sheet to find ways to be sexual in your own way. Follow-up care is a key part of your treatment and safety. Be sure to make and go to all appointments, and call your doctor if you are having problems. It's also a good idea to know your test results and keep a list of the medicines you take. How can you care for yourself at home? Take it easy at work  · Take frequent breaks. If possible, stop working when you are tired, and rest during your lunch hour. · Take bathroom breaks every 2 hours. · Change positions often. If you sit for long periods, stand up and walk around. · When you stand for a long time, keep one foot on a low stool with your knee bent. After standing a lot, sit with your feet up. · Avoid fumes, chemicals, and tobacco smoke. Be sexual in your own way  · Having sex during pregnancy is okay, unless your doctor tells you not to. · You may be very interested in sex, or you may have no interest at all. · Your growing belly can make it hard to find a good position during intercourse. [place] and explore. · You may get cramps in your uterus when your partner touches your breasts.   · A back rub may relieve the backache or cramps that sometimes follow orgasm. Learn about  labor  · Watch for signs of  labor. You may be going into labor if:  ¨ You have menstrual-like cramps, with or without nausea. ¨ You have about 4 or more contractions in 20 minutes, or about 8 or more within 1 hour, even after you have had a glass of water and are resting. ¨ You have a low, dull backache that does not go away when you change your position. ¨ You have pain or pressure in your pelvis that comes and goes in a pattern. ¨ You have intestinal cramping or flu-like symptoms, with or without diarrhea. ¨ You notice an increase or change in your vaginal discharge. Discharge may be heavy, mucus-like, watery, or streaked with blood. ¨ Your water breaks. · If you think you have  labor:  ¨ Drink 2 or 3 glasses of water or juice. Not drinking enough fluids can cause contractions. ¨ Stop what you are doing, and empty your bladder. Then lie down on your left side for at least 1 hour. ¨ While lying on your side, find your breast bone. Put your fingers in the soft spot just below it. Move your fingers down toward your belly button to find the top of your uterus. Check to see if it is tight. ¨ Contractions can be weak or strong. Record your contractions for an hour. Time a contraction from the start of one contraction to the start of the next one. ¨ Single or several strong contractions without a pattern are called Malcom-Bonilla contractions. They are practice contractions but not the start of labor. They often stop if you change what you are doing. ¨ Call your doctor if you have regular contractions. Where can you learn more? Go to http://kassidy-yinka.info/. Enter T618 in the search box to learn more about \"Weeks 26 to 30 of Your Pregnancy: Care Instructions. \"  Current as of: 2017  Content Version: 11.4  © 0661-4389 [employer].  Care instructions adapted under license by [employer] (which disclaims liability or warranty for this information). If you have questions about a medical condition or this instruction, always ask your healthcare professional. [place] [address] any warranty or liability for your use of this information.

## 2017-12-04 NOTE — PROGRESS NOTES
Saw EUSEBIO,  nl. Has appt Thur. Started on Z-pack 2d ago, now cough productive of green sputum. Lungs with exp rhonchi (improved from last week). TDAP today. KAYLIN 1wk.

## 2017-12-07 ENCOUNTER — HOSPITAL ENCOUNTER (OUTPATIENT)
Dept: PERINATAL CARE | Age: 37
Discharge: HOME | End: 2017-12-07
Attending: OBSTETRICS & GYNECOLOGY
Payer: OTHER GOVERNMENT

## 2017-12-07 PROCEDURE — 76815 OB US LIMITED FETUS(S): CPT | Performed by: OBSTETRICS & GYNECOLOGY

## 2017-12-11 ENCOUNTER — HOSPITAL ENCOUNTER (OUTPATIENT)
Dept: PERINATAL CARE | Age: 37
Discharge: HOME | End: 2017-12-11
Attending: OBSTETRICS & GYNECOLOGY
Payer: OTHER GOVERNMENT

## 2017-12-11 ENCOUNTER — ROUTINE PRENATAL (OUTPATIENT)
Dept: OBGYN CLINIC | Age: 37
End: 2017-12-11

## 2017-12-11 VITALS
SYSTOLIC BLOOD PRESSURE: 106 MMHG | BODY MASS INDEX: 37.77 KG/M2 | DIASTOLIC BLOOD PRESSURE: 69 MMHG | HEIGHT: 66 IN | HEART RATE: 91 BPM | WEIGHT: 235 LBS

## 2017-12-11 DIAGNOSIS — Z3A.29 29 WEEKS GESTATION OF PREGNANCY: ICD-10-CM

## 2017-12-11 DIAGNOSIS — O30.013 MONOAMNIOTIC AND MONOCHORIONIC TWIN GESTATION IN THIRD TRIMESTER: Primary | ICD-10-CM

## 2017-12-11 PROCEDURE — 76815 OB US LIMITED FETUS(S): CPT | Performed by: OBSTETRICS & GYNECOLOGY

## 2017-12-11 NOTE — PATIENT INSTRUCTIONS
Weeks 26 to 30 of Your Pregnancy: Care Instructions  Your Care Instructions    You are now in your last trimester of pregnancy. Your baby is growing rapidly. And you'll probably feel your baby moving around more often. Your doctor may ask you to count your baby's kicks. Your back may ache as your body gets used to your baby's size and length. If you haven't already had the Tdap shot during this pregnancy, talk to your doctor about getting it. It will help protect your  against pertussis infection. During this time, it's important to take care of yourself and pay attention to what your body needs. If you feel sexual, explore ways to be close with your partner that match your comfort and desire. Use the tips provided in this care sheet to find ways to be sexual in your own way. Follow-up care is a key part of your treatment and safety. Be sure to make and go to all appointments, and call your doctor if you are having problems. It's also a good idea to know your test results and keep a list of the medicines you take. How can you care for yourself at home? Take it easy at work  · Take frequent breaks. If possible, stop working when you are tired, and rest during your lunch hour. · Take bathroom breaks every 2 hours. · Change positions often. If you sit for long periods, stand up and walk around. · When you stand for a long time, keep one foot on a low stool with your knee bent. After standing a lot, sit with your feet up. · Avoid fumes, chemicals, and tobacco smoke. Be sexual in your own way  · Having sex during pregnancy is okay, unless your doctor tells you not to. · You may be very interested in sex, or you may have no interest at all. · Your growing belly can make it hard to find a good position during intercourse. [place] and explore. · You may get cramps in your uterus when your partner touches your breasts.   · A back rub may relieve the backache or cramps that sometimes follow orgasm. Learn about  labor  · Watch for signs of  labor. You may be going into labor if:  ¨ You have menstrual-like cramps, with or without nausea. ¨ You have about 4 or more contractions in 20 minutes, or about 8 or more within 1 hour, even after you have had a glass of water and are resting. ¨ You have a low, dull backache that does not go away when you change your position. ¨ You have pain or pressure in your pelvis that comes and goes in a pattern. ¨ You have intestinal cramping or flu-like symptoms, with or without diarrhea. ¨ You notice an increase or change in your vaginal discharge. Discharge may be heavy, mucus-like, watery, or streaked with blood. ¨ Your water breaks. · If you think you have  labor:  ¨ Drink 2 or 3 glasses of water or juice. Not drinking enough fluids can cause contractions. ¨ Stop what you are doing, and empty your bladder. Then lie down on your left side for at least 1 hour. ¨ While lying on your side, find your breast bone. Put your fingers in the soft spot just below it. Move your fingers down toward your belly button to find the top of your uterus. Check to see if it is tight. ¨ Contractions can be weak or strong. Record your contractions for an hour. Time a contraction from the start of one contraction to the start of the next one. ¨ Single or several strong contractions without a pattern are called Malcom-Bonilla contractions. They are practice contractions but not the start of labor. They often stop if you change what you are doing. ¨ Call your doctor if you have regular contractions. Where can you learn more? Go to http://kassidy-yinka.info/. Enter V706 in the search box to learn more about \"Weeks 26 to 30 of Your Pregnancy: Care Instructions. \"  Current as of: 2017  Content Version: 11.4  © 4682-0932 [employer].  Care instructions adapted under license by [employer] (which disclaims liability or warranty for this information). If you have questions about a medical condition or this instruction, always ask your healthcare professional. [place] [address] any warranty or liability for your use of this information.

## 2017-12-11 NOTE — PROGRESS NOTES
+fm.  No VB/LOF/ctx. Completed Zithromax. PE: diffuse exp rhonchi L>>R; planning to see PCP tomorrow. Has MFM appt today. RTO 1wk.

## 2017-12-14 ENCOUNTER — HOSPITAL ENCOUNTER (OUTPATIENT)
Dept: PERINATAL CARE | Age: 37
Discharge: HOME | End: 2017-12-14
Attending: OBSTETRICS & GYNECOLOGY
Payer: OTHER GOVERNMENT

## 2017-12-14 PROCEDURE — 76815 OB US LIMITED FETUS(S): CPT | Performed by: OBSTETRICS & GYNECOLOGY

## 2017-12-18 ENCOUNTER — ROUTINE PRENATAL (OUTPATIENT)
Dept: OBGYN CLINIC | Age: 37
End: 2017-12-18

## 2017-12-18 ENCOUNTER — HOSPITAL ENCOUNTER (OUTPATIENT)
Dept: PERINATAL CARE | Age: 37
Discharge: HOME | End: 2017-12-18
Attending: OBSTETRICS & GYNECOLOGY
Payer: OTHER GOVERNMENT

## 2017-12-18 VITALS
SYSTOLIC BLOOD PRESSURE: 108 MMHG | HEIGHT: 66 IN | HEART RATE: 105 BPM | WEIGHT: 236 LBS | BODY MASS INDEX: 37.93 KG/M2 | DIASTOLIC BLOOD PRESSURE: 75 MMHG

## 2017-12-18 DIAGNOSIS — Z3A.30 30 WEEKS GESTATION OF PREGNANCY: ICD-10-CM

## 2017-12-18 DIAGNOSIS — O30.013 MONOAMNIOTIC AND MONOCHORIONIC TWIN GESTATION IN THIRD TRIMESTER: Primary | ICD-10-CM

## 2017-12-18 PROCEDURE — 76819 FETAL BIOPHYS PROFIL W/O NST: CPT | Performed by: OBSTETRICS & GYNECOLOGY

## 2017-12-18 RX ORDER — SALINE NASAL SPRAY 1.5 OZ
SOLUTION NASAL
COMMUNITY
Start: 2017-12-12

## 2017-12-18 RX ORDER — OXYMETAZOLINE HCL 0.05 %
SPRAY, NON-AEROSOL (ML) NASAL
COMMUNITY
Start: 2017-12-12

## 2017-12-18 RX ORDER — CETIRIZINE HCL 10 MG
TABLET ORAL
COMMUNITY
Start: 2017-12-12

## 2017-12-18 RX ORDER — PSEUDOEPHEDRINE HCL 30 MG/1
TABLET, FILM COATED ORAL
COMMUNITY
Start: 2017-12-12

## 2017-12-18 RX ORDER — CEPHALEXIN 250 MG/1
CAPSULE ORAL
COMMUNITY
Start: 2017-12-12

## 2017-12-18 RX ORDER — FLUTICASONE PROPIONATE 50 MCG
SPRAY, SUSPENSION (ML) NASAL
COMMUNITY
Start: 2017-12-12

## 2017-12-18 NOTE — PATIENT INSTRUCTIONS
Weeks 30 to 32 of Your Pregnancy: Care Instructions  Your Care Instructions    You have made it to the final months of your pregnancy. By now, your baby is really starting to look like a baby, with hair and plump skin. As you enter the final weeks of pregnancy, the reality of having a baby may start to set in. This is the time to settle on a name, get your household in order, set up a safe nursery, and find quality  if needed. Doing these things in advance will allow you to focus on caring for and enjoying your new baby. You may also want to have a tour of your hospital's labor and delivery unit to get a better idea of what to expect while you are in the hospital.  During these last months, it is very important to take good care of yourself and pay attention to what your body needs. If your doctor says it is okay for you to work, don't push yourself too hard. Use the tips provided in this care sheet to ease heartburn and care for varicose veins. If you haven't already had the Tdap shot during this pregnancy, talk to your doctor about getting it. It will help protect your  against pertussis infection. Follow-up care is a key part of your treatment and safety. Be sure to make and go to all appointments, and call your doctor if you are having problems. It's also a good idea to know your test results and keep a list of the medicines you take. How can you care for yourself at home? Pay attention to your baby's movements  · You should feel your baby move several times every day. · Your baby now turns less, and kicks and jabs more. · Your baby sleeps 20 to 45 minutes at a time and is more active at certain times of day. · If your doctor wants you to count your baby's kicks:  ¨ Empty your bladder, and lie on your side or relax in a comfortable chair. ¨ Write down your start time. ¨ Pay attention only to your baby's movements. Count any movement except hiccups.   ¨ After you have counted 10 movements, write down your stop time. ¨ Write down how many minutes it took for your baby to move 10 times. ¨ If an hour goes by and you have not recorded 10 movements, have something to eat or drink and then count for another hour. If you do not record 10 movements in either hour, call your doctor. Ease heartburn  · Eat small, frequent meals. · Do not eat chocolate, peppermint, or very spicy foods. Avoid drinks with caffeine, such as coffee, tea, and sodas. · Avoid bending over or lying down after meals. · Talk a short walk after you eat. · If heartburn is a problem at night, do not eat for 2 hours before bedtime. · Take antacids like Mylanta, Maalox, Rolaids, or Tums. Do not take antacids that have sodium bicarbonate. Care for varicose veins  · Varicose veins are blood vessels that stretch out with the extra blood during pregnancy. Your legs may ache or throb. Most varicose veins will go away after the birth. · Avoid standing for long periods of time. Sit with your legs crossed at the ankles, not the knees. · Sit with your feet propped up. · Avoid tight clothing or stockings. Wear support hose. · Exercise regularly. Try walking for at least 30 minutes a day. Where can you learn more? Go to http://kassidy-yinka.info/. Enter U377 in the search box to learn more about \"Weeks 30 to 32 of Your Pregnancy: Care Instructions. \"  Current as of: March 16, 2017  Content Version: 11.4  © 0617-6682 [employer]. Care instructions adapted under license by [employer] (which disclaims liability or warranty for this information). If you have questions about a medical condition or this instruction, always ask your healthcare professional. [place] [address] any warranty or liability for your use of this information.

## 2017-12-18 NOTE — PROGRESS NOTES
+FM. Some suprapubic pain, better w/ rest c/w MSK. Has MFM appt today, going 3x/wk. GBS next wk. Will need BMZ (consider 31-32wks, will check with MFM). RTO 1wk.

## 2017-12-20 ENCOUNTER — HOSPITAL ENCOUNTER (OUTPATIENT)
Dept: PERINATAL CARE | Age: 37
Discharge: HOME | End: 2017-12-20
Attending: OBSTETRICS & GYNECOLOGY
Payer: OTHER GOVERNMENT

## 2017-12-20 PROCEDURE — 76819 FETAL BIOPHYS PROFIL W/O NST: CPT | Performed by: OBSTETRICS & GYNECOLOGY

## 2017-12-22 ENCOUNTER — HOSPITAL ENCOUNTER (OUTPATIENT)
Dept: PERINATAL CARE | Age: 37
Discharge: HOME | End: 2017-12-22
Attending: OBSTETRICS & GYNECOLOGY
Payer: OTHER GOVERNMENT

## 2017-12-22 PROCEDURE — 76818 FETAL BIOPHYS PROFILE W/NST: CPT | Performed by: OBSTETRICS & GYNECOLOGY

## 2017-12-26 ENCOUNTER — HOSPITAL ENCOUNTER (OUTPATIENT)
Dept: PERINATAL CARE | Age: 37
Discharge: HOME | End: 2017-12-26
Attending: OBSTETRICS & GYNECOLOGY
Payer: OTHER GOVERNMENT

## 2017-12-26 PROCEDURE — 76816 OB US FOLLOW-UP PER FETUS: CPT | Performed by: OBSTETRICS & GYNECOLOGY

## 2017-12-26 PROCEDURE — 76820 UMBILICAL ARTERY ECHO: CPT | Performed by: OBSTETRICS & GYNECOLOGY

## 2017-12-26 PROCEDURE — 76819 FETAL BIOPHYS PROFIL W/O NST: CPT | Performed by: OBSTETRICS & GYNECOLOGY

## 2017-12-27 ENCOUNTER — ROUTINE PRENATAL (OUTPATIENT)
Dept: OBGYN CLINIC | Age: 37
End: 2017-12-27

## 2017-12-27 VITALS
SYSTOLIC BLOOD PRESSURE: 106 MMHG | WEIGHT: 239 LBS | HEIGHT: 66 IN | HEART RATE: 103 BPM | BODY MASS INDEX: 38.41 KG/M2 | DIASTOLIC BLOOD PRESSURE: 72 MMHG

## 2017-12-27 DIAGNOSIS — Z3A.31 31 WEEKS GESTATION OF PREGNANCY: ICD-10-CM

## 2017-12-27 DIAGNOSIS — O30.013 MONOAMNIOTIC AND MONOCHORIONIC TWIN GESTATION IN THIRD TRIMESTER: Primary | ICD-10-CM

## 2017-12-27 DIAGNOSIS — O09.523 AMA (ADVANCED MATERNAL AGE) MULTIGRAVIDA 35+, THIRD TRIMESTER: ICD-10-CM

## 2017-12-27 LAB — GRBS, EXTERNAL: POSITIVE

## 2017-12-27 RX ORDER — BETAMETHASONE SODIUM PHOSPHATE AND BETAMETHASONE ACETATE 3; 3 MG/ML; MG/ML
12 INJECTION, SUSPENSION INTRA-ARTICULAR; INTRALESIONAL; INTRAMUSCULAR; SOFT TISSUE EVERY 24 HOURS
Qty: 1 VIAL | Refills: 0 | Status: SHIPPED | OUTPATIENT
Start: 2017-12-27 | End: 2017-12-29

## 2017-12-27 NOTE — PROGRESS NOTES
Saw MFM yesterday. N=[id number] (44%), T=[id number] (62%). Dioscordance 5.5%. Has appt Fri. Plan BMZ next wk - RX printed -- she [place] and then we can administer in offic, o/w can go to L&D next wk. C/S 1/8. GBS today. RTO 1wk.

## 2017-12-27 NOTE — PATIENT INSTRUCTIONS
Weeks 30 to 32 of Your Pregnancy: Care Instructions  Your Care Instructions    You have made it to the final months of your pregnancy. By now, your baby is really starting to look like a baby, with hair and plump skin. As you enter the final weeks of pregnancy, the reality of having a baby may start to set in. This is the time to settle on a name, get your household in order, set up a safe nursery, and find quality  if needed. Doing these things in advance will allow you to focus on caring for and enjoying your new baby. You may also want to have a tour of your hospital's labor and delivery unit to get a better idea of what to expect while you are in the hospital.  During these last months, it is very important to take good care of yourself and pay attention to what your body needs. If your doctor says it is okay for you to work, don't push yourself too hard. Use the tips provided in this care sheet to ease heartburn and care for varicose veins. If you haven't already had the Tdap shot during this pregnancy, talk to your doctor about getting it. It will help protect your  against pertussis infection. Follow-up care is a key part of your treatment and safety. Be sure to make and go to all appointments, and call your doctor if you are having problems. It's also a good idea to know your test results and keep a list of the medicines you take. How can you care for yourself at home? Pay attention to your baby's movements  · You should feel your baby move several times every day. · Your baby now turns less, and kicks and jabs more. · Your baby sleeps 20 to 45 minutes at a time and is more active at certain times of day. · If your doctor wants you to count your baby's kicks:  ¨ Empty your bladder, and lie on your side or relax in a comfortable chair. ¨ Write down your start time. ¨ Pay attention only to your baby's movements. Count any movement except hiccups.   ¨ After you have counted 10 movements, write down your stop time. ¨ Write down how many minutes it took for your baby to move 10 times. ¨ If an hour goes by and you have not recorded 10 movements, have something to eat or drink and then count for another hour. If you do not record 10 movements in either hour, call your doctor. Ease heartburn  · Eat small, frequent meals. · Do not eat chocolate, peppermint, or very spicy foods. Avoid drinks with caffeine, such as coffee, tea, and sodas. · Avoid bending over or lying down after meals. · Talk a short walk after you eat. · If heartburn is a problem at night, do not eat for 2 hours before bedtime. · Take antacids like Mylanta, Maalox, Rolaids, or Tums. Do not take antacids that have sodium bicarbonate. Care for varicose veins  · Varicose veins are blood vessels that stretch out with the extra blood during pregnancy. Your legs may ache or throb. Most varicose veins will go away after the birth. · Avoid standing for long periods of time. Sit with your legs crossed at the ankles, not the knees. · Sit with your feet propped up. · Avoid tight clothing or stockings. Wear support hose. · Exercise regularly. Try walking for at least 30 minutes a day. Where can you learn more? Go to http://kassidy-yinka.info/. Enter S003 in the search box to learn more about \"Weeks 30 to 32 of Your Pregnancy: Care Instructions. \"  Current as of: March 16, 2017  Content Version: 11.4  © 5849-7719 [employer]. Care instructions adapted under license by [employer] (which disclaims liability or warranty for this information). If you have questions about a medical condition or this instruction, always ask your healthcare professional. [place] [address] any warranty or liability for your use of this information.

## 2017-12-29 ENCOUNTER — HOSPITAL ENCOUNTER (OUTPATIENT)
Dept: PERINATAL CARE | Age: 37
Discharge: HOME | End: 2017-12-29
Attending: OBSTETRICS & GYNECOLOGY
Payer: OTHER GOVERNMENT

## 2017-12-29 LAB — GP B STREP DNA SPEC QL NAA+PROBE: POSITIVE

## 2017-12-29 PROCEDURE — 76818 FETAL BIOPHYS PROFILE W/NST: CPT | Performed by: OBSTETRICS & GYNECOLOGY

## 2018-01-02 ENCOUNTER — HOSPITAL ENCOUNTER (OUTPATIENT)
Dept: PERINATAL CARE | Age: 38
Discharge: HOME | End: 2018-01-02
Attending: OBSTETRICS & GYNECOLOGY
Payer: OTHER GOVERNMENT

## 2018-01-02 ENCOUNTER — ROUTINE PRENATAL (OUTPATIENT)
Dept: OBGYN CLINIC | Age: 38
End: 2018-01-02

## 2018-01-02 ENCOUNTER — HOSPITAL ENCOUNTER (EMERGENCY)
Age: 38
Discharge: HOME | End: 2018-01-02
Attending: OBSTETRICS & GYNECOLOGY | Admitting: OBSTETRICS & GYNECOLOGY
Payer: OTHER GOVERNMENT

## 2018-01-02 VITALS
DIASTOLIC BLOOD PRESSURE: 67 MMHG | WEIGHT: 244 LBS | BODY MASS INDEX: 39.21 KG/M2 | SYSTOLIC BLOOD PRESSURE: 101 MMHG | HEIGHT: 66 IN | HEART RATE: 91 BPM

## 2018-01-02 DIAGNOSIS — O30.013 MONOAMNIOTIC AND MONOCHORIONIC TWIN GESTATION IN THIRD TRIMESTER: ICD-10-CM

## 2018-01-02 DIAGNOSIS — O09.523 AMA (ADVANCED MATERNAL AGE) MULTIGRAVIDA 35+, THIRD TRIMESTER: Primary | ICD-10-CM

## 2018-01-02 DIAGNOSIS — Z3A.32 32 WEEKS GESTATION OF PREGNANCY: ICD-10-CM

## 2018-01-02 PROCEDURE — 74011250636 HC RX REV CODE- 250/636: Performed by: OBSTETRICS & GYNECOLOGY

## 2018-01-02 PROCEDURE — 76818 FETAL BIOPHYS PROFILE W/NST: CPT | Performed by: OBSTETRICS & GYNECOLOGY

## 2018-01-02 PROCEDURE — 96372 THER/PROPH/DIAG INJ SC/IM: CPT

## 2018-01-02 PROCEDURE — 99281 EMR DPT VST MAYX REQ PHY/QHP: CPT | Performed by: OBSTETRICS & GYNECOLOGY

## 2018-01-02 RX ORDER — BETAMETHASONE SODIUM PHOSPHATE AND BETAMETHASONE ACETATE 3; 3 MG/ML; MG/ML
12 INJECTION, SUSPENSION INTRA-ARTICULAR; INTRALESIONAL; INTRAMUSCULAR; SOFT TISSUE ONCE
Status: COMPLETED | OUTPATIENT
Start: 2018-01-02 | End: 2018-01-02

## 2018-01-02 RX ADMIN — BETAMETHASONE ACETATE AND BETAMETHASONE SODIUM PHOSPHATE 12 MG: 3; 3 INJECTION, SUSPENSION INTRA-ARTICULAR; INTRALESIONAL; INTRAMUSCULAR; SOFT TISSUE at 13:19

## 2018-01-02 NOTE — PROGRESS NOTES
1315 - Patient in room for betamethasone injection. Per MD orders patient only needs medication administration, no vitals no fetal monitoring. Medication discussed with patient. Medication administered in right gluteus baldev. Patient instructed to come back tomorrow at 1300 for second injection. 1325 - Patient and family ambulated off unit at this time with personal belongings.

## 2018-01-02 NOTE — PROGRESS NOTES
Taking antihistamine. Lungs - insp/exp rhonchi EUGENE>LLL>RLL -> adv to f/u with PCP. MFM today, then to L&D for BMZ #1, rpt tomorrow. Hibiclens given. C/S 1/8.

## 2018-01-03 ENCOUNTER — HOSPITAL ENCOUNTER (EMERGENCY)
Age: 38
Discharge: HOME | End: 2018-01-03
Attending: OBSTETRICS & GYNECOLOGY | Admitting: OBSTETRICS & GYNECOLOGY
Payer: OTHER GOVERNMENT

## 2018-01-03 PROCEDURE — 96372 THER/PROPH/DIAG INJ SC/IM: CPT

## 2018-01-03 PROCEDURE — 99218 HC RM OBSERVATION: CPT

## 2018-01-03 PROCEDURE — 74011250636 HC RX REV CODE- 250/636: Performed by: OBSTETRICS & GYNECOLOGY

## 2018-01-03 RX ORDER — BETAMETHASONE SODIUM PHOSPHATE AND BETAMETHASONE ACETATE 3; 3 MG/ML; MG/ML
12 INJECTION, SUSPENSION INTRA-ARTICULAR; INTRALESIONAL; INTRAMUSCULAR; SOFT TISSUE ONCE
Status: COMPLETED | OUTPATIENT
Start: 2018-01-03 | End: 2018-01-03

## 2018-01-03 RX ADMIN — BETAMETHASONE ACETATE AND BETAMETHASONE SODIUM PHOSPHATE 12 MG: 3; 3 INJECTION, SUSPENSION INTRA-ARTICULAR; INTRALESIONAL; INTRAMUSCULAR; SOFT TISSUE at 13:52

## 2018-01-03 NOTE — PROGRESS NOTES
1329 - Patient arrived ambulatory to room for second betamethasone injection. 1340 - Message sent to pharmacy to send betamethasone to L&D.     1352 - Betamethasone inject given in left gluteus baldev. RN answered patient questions regarding NICU, CS, visitor policy, and what to expect after surgery. Patient instructed not to eat after midnight the day of surgery. 1400 - Patient ambulated off unit at this time with personal belongings.

## 2018-01-05 ENCOUNTER — HOSPITAL ENCOUNTER (OUTPATIENT)
Dept: PERINATAL CARE | Age: 38
Discharge: HOME | End: 2018-01-05
Attending: OBSTETRICS & GYNECOLOGY | Admitting: OBSTETRICS & GYNECOLOGY
Payer: OTHER GOVERNMENT

## 2018-01-05 PROCEDURE — 76818 FETAL BIOPHYS PROFILE W/NST: CPT | Performed by: OBSTETRICS & GYNECOLOGY

## 2018-01-08 ENCOUNTER — HOSPITAL ENCOUNTER (INPATIENT)
Age: 38
LOS: 1 days | Discharge: ANOTHER HOSPITAL | End: 2018-01-08
Attending: OBSTETRICS & GYNECOLOGY | Admitting: OBSTETRICS & GYNECOLOGY
Payer: OTHER GOVERNMENT

## 2018-01-08 ENCOUNTER — ANESTHESIA (OUTPATIENT)
Dept: LABOR AND DELIVERY | Age: 38
End: 2018-01-08
Payer: OTHER GOVERNMENT

## 2018-01-08 ENCOUNTER — ANESTHESIA EVENT (OUTPATIENT)
Dept: LABOR AND DELIVERY | Age: 38
End: 2018-01-08
Payer: OTHER GOVERNMENT

## 2018-01-08 ENCOUNTER — HOSPITAL ENCOUNTER (INPATIENT)
Age: 38
LOS: 3 days | Discharge: HOME | DRG: 776 | End: 2018-01-11
Attending: OBSTETRICS & GYNECOLOGY | Admitting: OBSTETRICS & GYNECOLOGY
Payer: OTHER GOVERNMENT

## 2018-01-08 VITALS
HEART RATE: 90 BPM | DIASTOLIC BLOOD PRESSURE: 66 MMHG | WEIGHT: 244 LBS | RESPIRATION RATE: 17 BRPM | SYSTOLIC BLOOD PRESSURE: 112 MMHG | OXYGEN SATURATION: 99 % | BODY MASS INDEX: 39.21 KG/M2 | TEMPERATURE: 99.1 F | HEIGHT: 66 IN

## 2018-01-08 LAB
BASOPHILS # BLD: 0 K/UL (ref 0–0.1)
BASOPHILS NFR BLD: 0 % (ref 0–1)
EOSINOPHIL # BLD: 0.3 K/UL (ref 0–0.4)
EOSINOPHIL NFR BLD: 3 % (ref 0–7)
ERYTHROCYTE [DISTWIDTH] IN BLOOD BY AUTOMATED COUNT: 12.8 % (ref 11.5–14.5)
HCT VFR BLD AUTO: 35.5 % (ref 35–47)
HGB BLD-MCNC: 11.8 G/DL (ref 11.5–16)
LYMPHOCYTES # BLD: 2.1 K/UL (ref 0.8–3.5)
LYMPHOCYTES NFR BLD: 17 % (ref 12–49)
MCH RBC QN AUTO: 28.9 PG (ref 26–34)
MCHC RBC AUTO-ENTMCNC: 33.2 G/DL (ref 30–36.5)
MCV RBC AUTO: 86.8 FL (ref 80–99)
MONOCYTES # BLD: 0.8 K/UL (ref 0–1)
MONOCYTES NFR BLD: 7 % (ref 5–13)
NEUTS SEG # BLD: 8.7 K/UL (ref 1.8–8)
NEUTS SEG NFR BLD: 73 % (ref 32–75)
PLATELET # BLD AUTO: 269 K/UL (ref 150–400)
RBC # BLD AUTO: 4.09 M/UL (ref 3.8–5.2)
WBC # BLD AUTO: 11.9 K/UL (ref 3.6–11)

## 2018-01-08 PROCEDURE — 76010000398 HC C SECN MULTIPL EA ADDL 0.5 HR: Performed by: OBSTETRICS & GYNECOLOGY

## 2018-01-08 PROCEDURE — 74011250636 HC RX REV CODE- 250/636: Performed by: OBSTETRICS & GYNECOLOGY

## 2018-01-08 PROCEDURE — 74011000250 HC RX REV CODE- 250

## 2018-01-08 PROCEDURE — 76060000078 HC EPIDURAL ANESTHESIA: Performed by: OBSTETRICS & GYNECOLOGY

## 2018-01-08 PROCEDURE — 77030007866 HC KT SPN ANES BBMI -B: Performed by: ANESTHESIOLOGY

## 2018-01-08 PROCEDURE — 77030018836 HC SOL IRR NACL ICUM -A

## 2018-01-08 PROCEDURE — 74011250636 HC RX REV CODE- 250/636

## 2018-01-08 PROCEDURE — 88307 TISSUE EXAM BY PATHOLOGIST: CPT | Performed by: OBSTETRICS & GYNECOLOGY

## 2018-01-08 PROCEDURE — 77010026065 HC OXYGEN MINIMUM MEDICAL AIR: Performed by: OBSTETRICS & GYNECOLOGY

## 2018-01-08 PROCEDURE — 77030011640 HC PAD GRND REM COVD -A

## 2018-01-08 PROCEDURE — 74011250636 HC RX REV CODE- 250/636: Performed by: ANESTHESIOLOGY

## 2018-01-08 PROCEDURE — 36415 COLL VENOUS BLD VENIPUNCTURE: CPT | Performed by: OBSTETRICS & GYNECOLOGY

## 2018-01-08 PROCEDURE — 77030008459 HC STPLR SKN COOP -B

## 2018-01-08 PROCEDURE — 65410000002 HC RM PRIVATE OB

## 2018-01-08 PROCEDURE — 4A0HXCZ MEASUREMENT OF PRODUCTS OF CONCEPTION, CARDIAC RATE, EXTERNAL APPROACH: ICD-10-PCS | Performed by: OBSTETRICS & GYNECOLOGY

## 2018-01-08 PROCEDURE — 85025 COMPLETE CBC W/AUTO DIFF WBC: CPT | Performed by: OBSTETRICS & GYNECOLOGY

## 2018-01-08 PROCEDURE — 86901 BLOOD TYPING SEROLOGIC RH(D): CPT | Performed by: OBSTETRICS & GYNECOLOGY

## 2018-01-08 PROCEDURE — 77010026064 HC OXYGEN INFANT MED AIR MIN: Performed by: OBSTETRICS & GYNECOLOGY

## 2018-01-08 PROCEDURE — 75410000003 HC RECOV DEL/VAG/CSECN EA 0.5 HR: Performed by: OBSTETRICS & GYNECOLOGY

## 2018-01-08 PROCEDURE — 76815 OB US LIMITED FETUS(S): CPT | Performed by: OBSTETRICS & GYNECOLOGY

## 2018-01-08 PROCEDURE — 77030032490 HC SLV COMPR SCD KNE COVD -B

## 2018-01-08 PROCEDURE — 77030018809 HC RETRCTR ALXSO DISP AMR -B

## 2018-01-08 PROCEDURE — 76010000397 HC C SECN MULTIPL FIRST 1 HR: Performed by: OBSTETRICS & GYNECOLOGY

## 2018-01-08 PROCEDURE — 77030034850

## 2018-01-08 PROCEDURE — 65270000029 HC RM PRIVATE

## 2018-01-08 RX ORDER — OXYCODONE HYDROCHLORIDE 5 MG/1
5 TABLET ORAL
Status: DISCONTINUED | OUTPATIENT
Start: 2018-01-08 | End: 2018-01-08 | Stop reason: HOSPADM

## 2018-01-08 RX ORDER — FENTANYL CITRATE 50 UG/ML
INJECTION, SOLUTION INTRAMUSCULAR; INTRAVENOUS AS NEEDED
Status: DISCONTINUED | OUTPATIENT
Start: 2018-01-08 | End: 2018-01-08 | Stop reason: HOSPADM

## 2018-01-08 RX ORDER — OXYTOCIN 10 [USP'U]/ML
INJECTION, SOLUTION INTRAMUSCULAR; INTRAVENOUS AS NEEDED
Status: DISCONTINUED | OUTPATIENT
Start: 2018-01-08 | End: 2018-01-08 | Stop reason: HOSPADM

## 2018-01-08 RX ORDER — SODIUM CHLORIDE, SODIUM LACTATE, POTASSIUM CHLORIDE, CALCIUM CHLORIDE 600; 310; 30; 20 MG/100ML; MG/100ML; MG/100ML; MG/100ML
125 INJECTION, SOLUTION INTRAVENOUS CONTINUOUS
Status: DISCONTINUED | OUTPATIENT
Start: 2018-01-08 | End: 2018-01-08 | Stop reason: HOSPADM

## 2018-01-08 RX ORDER — BUPIVACAINE HYDROCHLORIDE 7.5 MG/ML
INJECTION, SOLUTION EPIDURAL; RETROBULBAR AS NEEDED
Status: DISCONTINUED | OUTPATIENT
Start: 2018-01-08 | End: 2018-01-08 | Stop reason: HOSPADM

## 2018-01-08 RX ORDER — SODIUM CHLORIDE, SODIUM LACTATE, POTASSIUM CHLORIDE, CALCIUM CHLORIDE 600; 310; 30; 20 MG/100ML; MG/100ML; MG/100ML; MG/100ML
75 INJECTION, SOLUTION INTRAVENOUS CONTINUOUS
Status: DISCONTINUED | OUTPATIENT
Start: 2018-01-09 | End: 2018-01-11 | Stop reason: HOSPADM

## 2018-01-08 RX ORDER — SODIUM CHLORIDE, SODIUM LACTATE, POTASSIUM CHLORIDE, CALCIUM CHLORIDE 600; 310; 30; 20 MG/100ML; MG/100ML; MG/100ML; MG/100ML
INJECTION, SOLUTION INTRAVENOUS
Status: DISCONTINUED | OUTPATIENT
Start: 2018-01-08 | End: 2018-01-08

## 2018-01-08 RX ORDER — SODIUM CHLORIDE 0.9 % (FLUSH) 0.9 %
5-10 SYRINGE (ML) INJECTION EVERY 8 HOURS
Status: DISCONTINUED | OUTPATIENT
Start: 2018-01-08 | End: 2018-01-08 | Stop reason: HOSPADM

## 2018-01-08 RX ORDER — IBUPROFEN 800 MG/1
800 TABLET ORAL
Status: DISCONTINUED | OUTPATIENT
Start: 2018-01-10 | End: 2018-01-08 | Stop reason: HOSPADM

## 2018-01-08 RX ORDER — DOCUSATE SODIUM 100 MG/1
100 CAPSULE, LIQUID FILLED ORAL 2 TIMES DAILY
Status: DISCONTINUED | OUTPATIENT
Start: 2018-01-09 | End: 2018-01-11 | Stop reason: HOSPADM

## 2018-01-08 RX ORDER — SIMETHICONE 80 MG
80 TABLET,CHEWABLE ORAL
Status: DISCONTINUED | OUTPATIENT
Start: 2018-01-08 | End: 2018-01-08 | Stop reason: HOSPADM

## 2018-01-08 RX ORDER — HYDROCODONE BITARTRATE AND ACETAMINOPHEN 5; 325 MG/1; MG/1
1 TABLET ORAL
Status: DISCONTINUED | OUTPATIENT
Start: 2018-01-08 | End: 2018-01-08 | Stop reason: HOSPADM

## 2018-01-08 RX ORDER — DIPHENHYDRAMINE HYDROCHLORIDE 50 MG/ML
12.5 INJECTION, SOLUTION INTRAMUSCULAR; INTRAVENOUS
Status: DISCONTINUED | OUTPATIENT
Start: 2018-01-08 | End: 2018-01-08 | Stop reason: HOSPADM

## 2018-01-08 RX ORDER — NALOXONE HYDROCHLORIDE 0.4 MG/ML
0.2 INJECTION, SOLUTION INTRAMUSCULAR; INTRAVENOUS; SUBCUTANEOUS
Status: DISCONTINUED | OUTPATIENT
Start: 2018-01-08 | End: 2018-01-08 | Stop reason: HOSPADM

## 2018-01-08 RX ORDER — HYDROMORPHONE HYDROCHLORIDE 2 MG/ML
0.5 INJECTION, SOLUTION INTRAMUSCULAR; INTRAVENOUS; SUBCUTANEOUS
Status: DISCONTINUED | OUTPATIENT
Start: 2018-01-08 | End: 2018-01-08 | Stop reason: HOSPADM

## 2018-01-08 RX ORDER — DOCUSATE SODIUM 100 MG/1
100 CAPSULE, LIQUID FILLED ORAL 2 TIMES DAILY
Status: DISCONTINUED | OUTPATIENT
Start: 2018-01-08 | End: 2018-01-08 | Stop reason: HOSPADM

## 2018-01-08 RX ORDER — SODIUM CHLORIDE 0.9 % (FLUSH) 0.9 %
5-10 SYRINGE (ML) INJECTION EVERY 8 HOURS
Status: DISCONTINUED | OUTPATIENT
Start: 2018-01-09 | End: 2018-01-11 | Stop reason: HOSPADM

## 2018-01-08 RX ORDER — SODIUM CHLORIDE 0.9 % (FLUSH) 0.9 %
5-10 SYRINGE (ML) INJECTION AS NEEDED
Status: DISCONTINUED | OUTPATIENT
Start: 2018-01-08 | End: 2018-01-11 | Stop reason: HOSPADM

## 2018-01-08 RX ORDER — SODIUM CHLORIDE 0.9 % (FLUSH) 0.9 %
5-10 SYRINGE (ML) INJECTION AS NEEDED
Status: DISCONTINUED | OUTPATIENT
Start: 2018-01-08 | End: 2018-01-08 | Stop reason: HOSPADM

## 2018-01-08 RX ORDER — ACETAMINOPHEN 325 MG/1
650 TABLET ORAL
Status: DISCONTINUED | OUTPATIENT
Start: 2018-01-08 | End: 2018-01-08 | Stop reason: HOSPADM

## 2018-01-08 RX ORDER — KETOROLAC TROMETHAMINE 30 MG/ML
30 INJECTION, SOLUTION INTRAMUSCULAR; INTRAVENOUS
Status: DISCONTINUED | OUTPATIENT
Start: 2018-01-08 | End: 2018-01-08 | Stop reason: HOSPADM

## 2018-01-08 RX ORDER — ONDANSETRON 2 MG/ML
INJECTION INTRAMUSCULAR; INTRAVENOUS AS NEEDED
Status: DISCONTINUED | OUTPATIENT
Start: 2018-01-08 | End: 2018-01-08 | Stop reason: HOSPADM

## 2018-01-08 RX ORDER — MORPHINE SULFATE 0.5 MG/ML
INJECTION, SOLUTION EPIDURAL; INTRATHECAL; INTRAVENOUS AS NEEDED
Status: DISCONTINUED | OUTPATIENT
Start: 2018-01-08 | End: 2018-01-08 | Stop reason: HOSPADM

## 2018-01-08 RX ORDER — OXYCODONE AND ACETAMINOPHEN 5; 325 MG/1; MG/1
1 TABLET ORAL
Status: DISCONTINUED | OUTPATIENT
Start: 2018-01-08 | End: 2018-01-11 | Stop reason: HOSPADM

## 2018-01-08 RX ORDER — OXYCODONE HYDROCHLORIDE 5 MG/1
10 TABLET ORAL
Status: DISCONTINUED | OUTPATIENT
Start: 2018-01-08 | End: 2018-01-08 | Stop reason: HOSPADM

## 2018-01-08 RX ORDER — EPHEDRINE SULFATE 50 MG/ML
INJECTION, SOLUTION INTRAVENOUS AS NEEDED
Status: DISCONTINUED | OUTPATIENT
Start: 2018-01-08 | End: 2018-01-08 | Stop reason: HOSPADM

## 2018-01-08 RX ORDER — IBUPROFEN 400 MG/1
800 TABLET ORAL EVERY 8 HOURS
Status: DISCONTINUED | OUTPATIENT
Start: 2018-01-09 | End: 2018-01-11 | Stop reason: HOSPADM

## 2018-01-08 RX ORDER — NALOXONE HYDROCHLORIDE 0.4 MG/ML
0.4 INJECTION, SOLUTION INTRAMUSCULAR; INTRAVENOUS; SUBCUTANEOUS AS NEEDED
Status: DISCONTINUED | OUTPATIENT
Start: 2018-01-08 | End: 2018-01-11 | Stop reason: HOSPADM

## 2018-01-08 RX ORDER — NALOXONE HYDROCHLORIDE 0.4 MG/ML
0.4 INJECTION, SOLUTION INTRAMUSCULAR; INTRAVENOUS; SUBCUTANEOUS AS NEEDED
Status: DISCONTINUED | OUTPATIENT
Start: 2018-01-08 | End: 2018-01-08 | Stop reason: HOSPADM

## 2018-01-08 RX ORDER — OXYTOCIN/RINGER'S LACTATE 20/1000 ML
125-500 PLASTIC BAG, INJECTION (ML) INTRAVENOUS ONCE
Status: DISCONTINUED | OUTPATIENT
Start: 2018-01-08 | End: 2018-01-08 | Stop reason: HOSPADM

## 2018-01-08 RX ORDER — CEFAZOLIN SODIUM IN 0.9 % NACL 2 G/50 ML
2 INTRAVENOUS SOLUTION, PIGGYBACK (ML) INTRAVENOUS ONCE
Status: COMPLETED | OUTPATIENT
Start: 2018-01-08 | End: 2018-01-08

## 2018-01-08 RX ORDER — ACETAMINOPHEN 325 MG/1
650 TABLET ORAL
Status: DISCONTINUED | OUTPATIENT
Start: 2018-01-08 | End: 2018-01-11 | Stop reason: HOSPADM

## 2018-01-08 RX ORDER — SIMETHICONE 80 MG
80 TABLET,CHEWABLE ORAL AS NEEDED
Status: DISCONTINUED | OUTPATIENT
Start: 2018-01-08 | End: 2018-01-11 | Stop reason: HOSPADM

## 2018-01-08 RX ORDER — SODIUM CHLORIDE, SODIUM LACTATE, POTASSIUM CHLORIDE, CALCIUM CHLORIDE 600; 310; 30; 20 MG/100ML; MG/100ML; MG/100ML; MG/100ML
1000 INJECTION, SOLUTION INTRAVENOUS CONTINUOUS
Status: DISCONTINUED | OUTPATIENT
Start: 2018-01-08 | End: 2018-01-08 | Stop reason: HOSPADM

## 2018-01-08 RX ORDER — OXYTOCIN/RINGER'S LACTATE 20/1000 ML
125-500 PLASTIC BAG, INJECTION (ML) INTRAVENOUS ONCE
Status: ACTIVE | OUTPATIENT
Start: 2018-01-09 | End: 2018-01-09

## 2018-01-08 RX ADMIN — SODIUM CHLORIDE, SODIUM LACTATE, POTASSIUM CHLORIDE, AND CALCIUM CHLORIDE 125 ML/HR: 600; 310; 30; 20 INJECTION, SOLUTION INTRAVENOUS at 19:44

## 2018-01-08 RX ADMIN — ONDANSETRON 4 MG: 2 INJECTION INTRAMUSCULAR; INTRAVENOUS at 08:51

## 2018-01-08 RX ADMIN — SODIUM CHLORIDE, SODIUM LACTATE, POTASSIUM CHLORIDE, AND CALCIUM CHLORIDE: 600; 310; 30; 20 INJECTION, SOLUTION INTRAVENOUS at 08:13

## 2018-01-08 RX ADMIN — BUPIVACAINE HYDROCHLORIDE 1.8 ML: 7.5 INJECTION, SOLUTION EPIDURAL; RETROBULBAR at 07:44

## 2018-01-08 RX ADMIN — KETOROLAC TROMETHAMINE 30 MG: 30 INJECTION, SOLUTION INTRAMUSCULAR at 17:23

## 2018-01-08 RX ADMIN — SODIUM CHLORIDE, SODIUM LACTATE, POTASSIUM CHLORIDE, AND CALCIUM CHLORIDE 1000 ML: 600; 310; 30; 20 INJECTION, SOLUTION INTRAVENOUS at 06:17

## 2018-01-08 RX ADMIN — CEFAZOLIN 2 G: 1 INJECTION, POWDER, FOR SOLUTION INTRAMUSCULAR; INTRAVENOUS; PARENTERAL at 07:08

## 2018-01-08 RX ADMIN — KETOROLAC TROMETHAMINE 30 MG: 30 INJECTION, SOLUTION INTRAMUSCULAR at 10:30

## 2018-01-08 RX ADMIN — OXYTOCIN 20 UNITS: 10 INJECTION, SOLUTION INTRAMUSCULAR; INTRAVENOUS at 08:19

## 2018-01-08 RX ADMIN — FENTANYL CITRATE 20 MCG: 50 INJECTION, SOLUTION INTRAMUSCULAR; INTRAVENOUS at 07:44

## 2018-01-08 RX ADMIN — OXYTOCIN 20 UNITS: 10 INJECTION, SOLUTION INTRAMUSCULAR; INTRAVENOUS at 08:22

## 2018-01-08 RX ADMIN — SODIUM CHLORIDE, SODIUM LACTATE, POTASSIUM CHLORIDE, AND CALCIUM CHLORIDE 125 ML/HR: 600; 310; 30; 20 INJECTION, SOLUTION INTRAVENOUS at 09:40

## 2018-01-08 RX ADMIN — EPHEDRINE SULFATE 10 MG: 50 INJECTION, SOLUTION INTRAVENOUS at 08:02

## 2018-01-08 RX ADMIN — SODIUM CHLORIDE, SODIUM LACTATE, POTASSIUM CHLORIDE, AND CALCIUM CHLORIDE 1000 ML: 600; 310; 30; 20 INJECTION, SOLUTION INTRAVENOUS at 07:07

## 2018-01-08 RX ADMIN — EPHEDRINE SULFATE 10 MG: 50 INJECTION, SOLUTION INTRAVENOUS at 07:56

## 2018-01-08 RX ADMIN — SODIUM CHLORIDE, SODIUM LACTATE, POTASSIUM CHLORIDE, AND CALCIUM CHLORIDE: 600; 310; 30; 20 INJECTION, SOLUTION INTRAVENOUS at 08:51

## 2018-01-08 RX ADMIN — MORPHINE SULFATE 250 MCG: 0.5 INJECTION, SOLUTION EPIDURAL; INTRATHECAL; INTRAVENOUS at 07:44

## 2018-01-08 NOTE — PROGRESS NOTES
01/08/18 2:53 PM  ISAIAS updated by NICU team and Reji Neal regarding baby B.  Baby B to be transferred to [facility] for neurological consult. Concerns regarding MOB and Baby A transfer. CM contacted [facility] to inquire about ability to transfer Baby A.  Directed to contact ; however, clinicals have not been sent on babies at this time and there is no contact CM for . CM contacted [facility] ISAIAS Rodriguez to discuss transfer. Made her aware of Baby B's transfer, discussed need for Baby A to also transfer due to MOB breastfeeding, need for bonding, etc.      Updated NICU team and Reji Neal. Plan to transfer Baby B, then transfer Baby A and MOB all to [facility]. L&D RN working to securing postpartum bed for MOB to transfer today. Carson working to transfer both babies. Carson in agreement with need to transfer baby A with MOB and Baby B for bonding, breastfeeding, and having the family in one location. ISAIAS contacted Jennifer [facility] ISAIAS, back to make her aware of transfer for all 3 patients.   CLYDE Rubio

## 2018-01-08 NOTE — ROUTINE PROCESS
6630: Bedside, Verbal and Written shift change report given to YINKA Lopes RN (oncoming nurse) by KENNEY Bright RN (offgoing nurse). Report included the following information SBAR, Kardex, Intake/Output, MAR, Accordion and Recent Results. 6625: YINKA Sinclair at pt bedside readjusting ultrasound and toco monitors. 6008: YINKA Hickey removing monitors to clean pt abdomen and to clip hair at surgical site. 7681: YINKA Hickey remaining at pt bedside to replace FHR and ultrasound monitors. 0161: Dr. Thom Kwok at pt bedside. MD requesting ultrasound and toco monitors be removed at this time for ultrasound    0724: Dr. Thom Kwok performing pt ultrasound at this time    0731: Dr. Thom Kwok stating pt may remain off of FHR monitor until walking back to OR unless delayed surgery    0733: This RN informing Dr. Thom Kwok that Dr. Isela Zaidi is ready for pt and this RN to walk back    0738: This pt transferring to OR 2 from room 204 via ambulation with this RN and spouse    0740: Pt in OR 2 at  This time    0902: Pt transferrin from OR 2 to room 216 at this time via pt bed with this RN, spouse, and Dr. Isela Zaidi. 7040: Pt in room 216 at this time    0907: Dr. Isela Zaidi leaving pt bedside at this time. 1057: Bedside, Verbal and Written shift change report given to MERCY Garcia (oncoming nurse) by YINKA Lopes RN (offgoing nurse). Report included the following information SBAR, Kardex, OR Summary, Intake/Output, MAR, Accordion and Recent Results.

## 2018-01-08 NOTE — IP AVS SNAPSHOT
[address] 
[phone] Patient: Shar Squires MRN: [id number] :1980 About your hospitalization You were admitted on:  2018 You last received care in the:  [facility] [address] [facility] You were discharged on:  2018 Why you were hospitalized Your primary diagnosis was:  Not on File Your diagnoses also included:  Pregnancy Follow-up Information Follow up With Details Comments Contact Info None   None ([phone]) Patient stated that they have no PCP Discharge Orders None A check alonso indicates which time of day the medication should be taken. My Medications START taking these medications Instructions Each Dose to Equal  
 Morning Noon Evening Bedtime  
 ibuprofen 800 mg tablet Commonly known as:  MOTRIN Your last dose was: Your next dose is: Take 1 Tab by mouth every eight (8) hours. 800 mg  
    
   
   
   
  
 oxyCODONE-acetaminophen 5-325 mg per tablet Commonly known as:  PERCOCET Your last dose was: Your next dose is: Take 1 Tab by mouth every four (4) hours as needed. Max Daily Amount: 6 Tabs. 1 Tab ASK your doctor about these medications Instructions Each Dose to Equal  
 Morning Noon Evening Bedtime ADVAIR DISKUS 100-50 mcg/dose diskus inhaler Generic drug:  fluticasone-salmeterol Your last dose was: Your next dose is:    
   
   
      
   
   
   
  
 albuterol 2.5 mg /3 mL (0.083 %) nebulizer solution Commonly known as:  PROVENTIL VENTOLIN Your last dose was: Your next dose is:    
   
   
      
   
   
   
  
 ASPIR-LOW 81 mg tablet Generic drug:  aspirin delayed-release Your last dose was: Your next dose is:    
   
   
      
   
   
   
  
 azithromycin 250 mg tablet Commonly known as:  Victoriano Dubin Your last dose was: Your next dose is:    
   
   
 take 2 tablets by mouth on day 1 then take 1 tablet on days 2 through 5  
     
   
   
   
  
 cetirizine 10 mg tablet Commonly known as:  ZYRTEC Your last dose was: Your next dose is: DEEP SEA NASAL 0.65 % nasal spray Generic drug:  sodium chloride Your last dose was: Your next dose is:    
   
   
      
   
   
   
  
 fluticasone 50 mcg/actuation nasal spray Commonly known as:  Suzy Turpin Your last dose was: Your next dose is:    
   
   
      
   
   
   
  
 folic acid 1 mg tablet Commonly known as:  Ender Your last dose was: Your next dose is: Take 2 Tabs by mouth daily. 2 mg Nasal Decongestant (Oxymetazl) 0.05 % nasal spray Generic drug:  oxymetazoline Your last dose was: Your next dose is:    
   
   
      
   
   
   
  
 predniSONE 20 mg tablet Commonly known as:  Renee Bertrand Your last dose was: Your next dose is: PRENATAL DHA+COMPLETE PRENATAL -300 mg-mcg-mg Cmpk Generic drug:  [id number] zakiya-folic-dha Your last dose was: Your next dose is: Take  by mouth. SUDOGEST 30 mg tablet Generic drug:  pseudoephedrine Your last dose was: Your next dose is:    
   
   
      
   
   
   
  
  
  
Where to Get Your Medications Information on where to get these meds will be given to you by the nurse or doctor. ! Ask your nurse or doctor about these medications  
  ibuprofen 800 mg tablet  
 oxyCODONE-acetaminophen 5-325 mg per tablet Discharge Instructions None Introducing [facility]! Dear Siddharth Wright: Thank you for requesting a Sc[employer] account.   Our records indicate that you already have an active [employer] account. You can access your account anytime at [email or web address] Did you know that you can access your hospital and ER discharge instructions at any time in [employer]? You can also review all of your test results from your hospital stay or ER visit. Additional Information If you have questions, please visit the Frequently Asked Questions section of the [employer] website at [email or web address] Remember, [employer] is NOT to be used for urgent needs. For medical emergencies, dial 911. Now available from your iPhone and Android! Providers Seen During Your Hospitalization Provider Specialty Primary office phone Keshawn Upton MD Obstetrics & Gynecology [phone] Immunizations Administered for This Admission Name Date MMR 1/11/2018 Your Primary Care Physician (PCP) Primary Care Physician Office Phone Office Fax NONE ** None ** ** None ** You are allergic to the following No active allergies Recent Documentation OB Status Smoking Status Recent pregnancy Former Smoker Emergency Contacts Name Discharge Info Relation Home Work Mobile FidelWilly camacho DISCHARGE CAREGIVER [3] Mother [14] [phone] [address] [facility] CAREGIVER [3] Parent [1] [phone] Patient Belongings The following personal items are in your possession at time of discharge: 
  Dental Appliances: None  Visual Aid: None      Home Medications: None   Jewelry: None Please provide this summary of care documentation to your next provider. Signatures-by signing, you are acknowledging that this After Visit Summary has been reviewed with you and you have received a copy. Patient Signature:  ____________________________________________________________ Date:  ____________________________________________________________  
  
Hurshel Och Provider Signature:  ____________________________________________________________ Date:  ____________________________________________________________

## 2018-01-08 NOTE — OP NOTES
Section Delivery Procedure Note         Name: Annita Norton      Medical Record Number: [id number]      YOB: 1980     Today's Date: 2018     Date of Procedure:  2018      Preoperative Diagnosis: TWINS    Postoperative Diagnosis: repeat  with mono mono twins    Procedure: Low Cervical Transverse Procedure(s):   SECTION MULTIPLE    Surgeon(s):  MD Stuart Jordan MD    Anesthesia: Other    EBL:  750cc    Prophylactic Antibiotics: Ancef         Fetal Description: multiple gestation 2 female    Birth Information:   Information for the patient's :  Rodolfo Robison, Female A [[id number]]   One Minute Apgar:  (Filed from Delivery Summary)  Five  Minute Apgar:  (Filed from Delivery Summary)  Information for the patient's :  Catarino Rogers Female B [[id number]]   One Minute Apgar:  (Filed from Delivery Summary)  Five  Minute Apgar:  (Filed from Delivery Summary)      Umbilical Cord: True knot    Placenta:  expressed and traction    Specimens: * No specimens in log *            Complications:  none    Procedure Details: The patient was taken to the operating room, where spinal anesthesia was administered and found to be adequate. The patient was placed in a left-tilt position, SCDs placed, and prepped and draped in the normal sterile fashion, including placement of a kim catheter. A knife was used to excise the old Pfannenstiel scar. Bovie cautery was used to carry the incision through the subcutaneous tissue and for point hemostasis. The fascia was knicked in the midline with the cautery. The fascial incision was extended bilaterally with hicks scissors. The fascial edges were grasped with Kocher clamps, and blunt, sharp, and cautery dissection used to dissect the fascia from the underlying muscle bellies. The muscle bellies were bluntly divided and pushed aside. Bovie cautery used for point hemostasis.   The peritoneum had been entered as the fascia was taken down. Omental adhesions were divided with cautery. The peritoneal incision was stretched. An Wilfredo self-retaining retractor was placed. A bladder flap was created with blunt and sharp dissection with Metzenbaum scissors. A low transverse uterine incision was made with the scalpel and developed by applying traction in a cephalad and caudal direction. Amniotomy was performed and the fluid was large amount clear. The  head was elevated to the level of the incision and delivered without difficulty. The nose and mouth were bulb suctioned. The body was delivered. Twin B had spontaneously converted to a cephalic presentation. The head and body were delivered without difficulty. The cord  For twin B was clamped and cut and the baby was handed off to the waiting  care unit staff. Delayed cord clamping was allowed for twin A. The cord was then clamped and cut and the baby handed to the awaiting NICU team.    The placenta was then delivered using gentle traction and fundal massage. The uterus was cleared of all clots and debris. The uterine incision was closed with number 0-Monocryl  in two layers, the first a running locking fashion, the second an imbricating layer. Some persistent bleeding was noted from the left corner. An Good hemostasis was confirmed. The abdomen and pelvis were irrigated with warm normal saline. The uterine incision was again inspected and good hemostasis was again reassured and the uterus was returned to the abdomen. The Wilfredo retractor was removed. The muscle bellies were inspected and bovie cautery used for hemostasis. The fascia was closed with 0 PDS in a running fashion. The subcutaneous tissue was irrigated and bovie cautery used for point hemostasis. The deep subcutaneous tissue was reapproximated with 3.0 plain gut in a running fashion. The skin was closed with Insorb skin staples. Steristrips were placed.   The patient tolerated the procedure well. Sponge, lap, and needle counts were correct times three and the patient and baby were taken to recovery/postpartum room in stable condition.     Signed: Parisa S Milton Garcia MD      January 8, 2018

## 2018-01-08 NOTE — ANESTHESIA PROCEDURE NOTES
Spinal Block    Start time: 1/8/2018 7:40 AM  End time: 1/8/2018 7:46 AM  Performed by: Cheri Perez  Authorized by: Mary Anne Faria:   Indications: at surgeon's request and primary anesthetic  Preanesthetic Checklist: patient identified, risks and benefits discussed, anesthesia consent and timeout performed    Timeout Time: 07:40          Spinal Block:   Patient Position:  Seated  Prep Region:  Lumbar  Prep: chlorhexidine      Location:  L3-4  Technique:  Single shot        Needle:   Needle Type:  Pencan  Needle Gauge:  25 G  Attempts:  1      Events: CSF confirmed, no blood with aspiration and no paresthesia        Assessment:  Insertion:  Uncomplicated  Patient tolerance:  Patient tolerated the procedure well with no immediate complications

## 2018-01-08 NOTE — L&D DELIVERY NOTE
Delivery Summary    Patient: Jose Antonio Voss MRN: [id number]  SSN: [id number]    YOB: 1980  Age: 44 y.o.   Sex: female        Labor Events:    Labor:    Edman Plan, Female A [[id number]]   No     Leaupepe, Female B [[id number]]   No     Rupture Date:    Jimmie Havers Female A [[id number]]         Jimmie Havers Female B [[id number]]   2018     Rupture Time:    Jimmie Havers Female A [[id number]]         Edman Plan, Female B [[id number]]   8:19 AM     Rupture Type:    Edman Plan, Female A [[id number]]         Leaupepe, Female B [[id number]]   AROM     Amniotic Fluid Volume:      Amniotic Fluid Description:  Amniotic fluid OdorNathanel Miguel Angel, Female A [[id number]]         Edman Plan, Female B [[id number]]   Gypsy Perches, Female A [[id number]]         Edman Plan, Female B [[id number]]   None      InductionNathanel Miguel Angel, Female A [[id number]]   None     Edman Plan, Female B [[id number]]   None         Induction Date:       Jimmie Havers Female A [[id number]]         Jimmie Havers Female B [[id number]]          Induction TimeNathanel Miguel Angel, Female A [[id number]]         Jimmie Havers Female B [[id number]]          Indications for Induction:    Edman Plan, Female A [[id number]]         Jimmie Havers Female B [[id number]]          AugmentationNathanel Miguel Angel Female A [[id number]]   None     Jimmie Havers Female B [[id number]]   None     Augmentation Date:    Jimmie Havers Female A [[id number]]         Jimmie Havers Female B [[id number]]         Augmentation TimeNathanel Miguel Angel Female A [[id number]]         Jimmie Havers Female B [[id number]]         Indications for AugmentationNathanel Miguel Angel, Female A [[id number]]         Jimmei Havers Female B [[id number]]         Events:       Jimmie Havers Female A [[id number]]         Jimmie Havers Female B [[id number]]         Cervical RipeningNathanel Miguel Angel, Female A [[id number]]         Edman Plan, Female B [[id number]]            Edman Plan, Female A [[id number]]         Edman Plan, Female B [[id number]]            Edman Plan, Female A [[id number]]   None Redd Pfeiffer B [[id number]]   None     Rupture Identifier:    Bonita Panchal Female A [[id number]]   Rupture 1     Leaupepe, Female B [[id number]]   Rupture 1      Labor complications:    Bonita Panchal Female A [[id number]]   None     Redd Pfeiffer B [[id number]]   None      Additional complications:    Kentrell Female A [[id number]]         Kentrell Female B [[id number]]            Delivery Events:  Estimated Blood Loss (ml):    Kentrell Female A [[id number]]         Bonita Panchal Female B [[id number]]           Information for the patient's :  Bonita Panchal Female A [[id number]]     Delivery Summary - Baby    Delivery Date: 2018  Delivery Time: 8:19 AM  Delivery Type: , Low Transverse    Section Delivery:     Sex:  female     Gestational Age: 33w2d  Delivery Clinician:  Kimmie Olvera   Living?: Living  Delivery Location: OR           APGARS  One minute Five minutes Ten minutes   Skin color:            Heart rate:            Grimace:            Muscle tone:            Breathing: Totals:               Presentation: Vertex    Position:        Resuscitation Method:  PPV;Suctioning-bulb; Oxygen; Tactile Stimulation     Meconium Stained: None      Cord Information: 3 Vessels   Complications: Knot  Cord Blood Sent?:  No    Blood Gases Sent?:  Yes    Placenta:  Date/Time:   8:19 AM  Removal: Expressed      Appearance: Normal      Measurements:  Birth Weight:        Birth Length:        Head Circumference:        Chest Circumference:       Abdominal Girth:       Other Providers:   Matthew MANLEY;CLAUDE KIRK;CHANO FORMAN;CHANO FORMAN;DALE DOBBS;MINE ALVARENGA;;Sabrina WHITAKER DONNA M Obstetrician;Primary Nurse;Primary  Nurse;Nicu Nurse;Neonatologist;Anesthesiologist;Crna;Nurse Practitioner;Respiratory Therapist       [facility] [[id number]]     Delivery Summary - Baby    Delivery Date: 2018  Delivery Time: 8:19 AM  Delivery Type: , Low Transverse    Section Delivery:     Sex:  female     Gestational Age: 33w2d  Delivery Clinician:  Marcel Wallace   Living?: Living  Delivery Location: OR           APGARS  One minute Five minutes Ten minutes   Skin color:            Heart rate:            Grimace:            Muscle tone:            Breathing: Totals:               Presentation: Transverse    Position:        Resuscitation Method:  Suctioning-bulb; Tactile Stimulation;PPV     Meconium Stained: None      Cord Information: 3 Vessels   Complications: Knot  Cord Blood Sent?:  No    Blood Gases Sent?:  Yes    Placenta:  Date/Time:   8:19 AM  Removal: Manual Removal      Appearance: Normal     Primghar Measurements:  Birth Weight:        Birth Length:        Head Circumference:        Chest Circumference:       Abdominal Girth:       Other Providers:   Nirmal MANLEY;CLAUDE KIRK;;JIM RAMOS;DALE DOBBS;MINE ALVARENGA;;Shin WHITAKER DONNA M Obstetrician;Primary Nurse;Primary  Nurse;Nicu Nurse;Neonatologist;Anesthesiologist;Crna;Nurse Practitioner;Respiratory Therapist           Group Beta Strep:   Lab Results   Component Value Date/Time    Genevievetrep, External Positive  2017        Cord Blood Results:  Information for the patient's :  Lyudmila Valentine, Female A [[id number]]   No results found for: Jeral Poag, PCTDIG, BILI, ABORHEXT, ABORH  Information for the patient's newbornGurinder Jalloh, Female B [[id number]]   No results found for: Jeral Poag, PCTDIG, BILI, ABORHEXT, ABORH    Information for the patient's :  Lyudmila Valentine, Female A [[id number]]   No results found for: APH, APCO2, APO2, AHCO3, ABEC, ABDC, O2ST, SITE, [place], PHI, Lory, PO2I, HCO3I, SO2I, IBD  Information for the patient's newbornGurinder Jalloh, Female B [[id number]]   No results found for: APH, APCO2, APO2, AHCO3, ABEC, ABDC, O2ST, SITE, RSCOM, PHI, PCO2I, PO2I, HCO3I, SO2I, IBD    Information for the patient's :  Cami Covington, Female A [[id number]]   No results found for: EPHV, PCO2V, PO2V, HCO3V, O2STV, EBDV  Information for the patient's newbornBrii Thayer, Female B [[id number]]   No results found for: EPHV, PCO2V, PO2V, HCO3V, O2STV, EBDV

## 2018-01-08 NOTE — PROGRESS NOTES
Assuming care at this time. 11:48 AM  Pt setup with breast pump. Teaching given on how to operate pump and use of flanges. 1:46 PM  NICU staff informed pt has questions and would like an update on twin A and twin B statuses. NICU staff unable to give an ETA as to when MD will be able to speak with pt.  2:31 PM  Pt transported to [facility] by RNx2 for transport to NICU. Tolerated well.

## 2018-01-08 NOTE — LACTATION NOTE
This note was copied from a baby's chart. Discussed with mother her plan for feeding. Reviewed the benefits of exclusive breast milk feeding during the hospital stay. Informed her of the risks of using formula to supplement in the first few days of life as well as the benefits of successful breast milk feeding; referred her to the Breastfeeding booklet about this information. She acknowledges understanding of information reviewed and states that it is her plan to breastfeed her infant. Will support her choice and offer additional information as needed. Reviewed breastfeeding basics:  How milk is made and normal  breastfeeding behaviors discussed. Supply and demand,  stomach size, early feeding cues, skin to skin bonding with comfortable positioning and baby led latch-on reviewed. How to identify signs of successful breastfeeding sessions reviewed; education on assymetrical latch, signs of effective latching vs shallow, in-effective latching, normal  feeding frequency and duration and expected infant output discussed. Normal course of breastfeeding discussed including the AAP's recommendation that children receive exclusive breast milk feedings for the first six months of life with breast milk feedings to continue through the first year of life and/or beyond as complimentary table foods are added. Breastfeeding Booklet and Warm line information provided with discussion. Discussed typical  weight loss and the importance of pediatrician appointment within 24-48 hours of discharge, at 2 weeks of life and normalcy of requesting pediatric weight checks as needed in between visits. Pumping:  Guidelines for pumping, milk collection and storage, proper cleaning of pump parts all reviewed. How to establish and maintain breast milk supply through pumping reviewed. Differences between hospital grade rental pumps vs store bought double electric/hand pumps discussed.   Set up pumping with double electric set up. Assisted with pump session. List of area pump rental locations and lactation support services provided. Mom is pumping. Discussed benefits of hand expression,  Mom states\" I know how to express drops. I have done it before. \"  [employer] a pumping log and supplies. Mom states\" I have a pump at home. \"  Informed she may need a hospital grade breast pump

## 2018-01-08 NOTE — PROGRESS NOTES
Spiritual Care Assessment/Progress Notes    Venkat Jack [id number]  [id number]    1980  44 y.o.  female    Patient Telephone Number: [phone] (home)   Pentecostalism Affiliation: No preference   Language: English   Extended Emergency Contact Information  Primary Emergency Contact: Ramon Rivreo Phone: [phone]  Relation: Mother  Secondary Emergency Contact: Vasyl Vicente,  Home Phone: [phone]  Relation: Parent   Patient Active Problem List    Diagnosis Date Noted    Abnormal Pap smear of cervix 07/24/2017    Pregnancy 07/24/2017        Date: 1/8/2018       Level of Pentecostalism/Spiritual Activity:  []         Involved in chandana tradition/spiritual practice    []         Not involved in chandana tradition/spiritual practice  []         Spiritually oriented    []         Claims no spiritual orientation    []         seeking spiritual identity  []         Feels alienated from Zoroastrianism practice/tradition  []         Feels angry about Zoroastrianism practice/tradition  []         Spirituality/Zoroastrianism tradition is a resource for coping at this time.   [x]         Not able to assess due to medical condition    Services Provided Today:  []         crisis intervention    []         reading Scriptures  []         spiritual assessment    []         prayer  []         empathic listening/emotional support  []         rites and rituals (cite in comments)  []         life review     []         Zoroastrianism support  []         theological development   []         advocacy  []         ethical dialog     []         blessing  []         bereavement support    []         support to family  []         anticipatory grief support   []         help with AMD  []         spiritual guidance    []         meditation      Spiritual Care Needs  []         Emotional Support  []         Spiritual/Pentecostalism Care  []         Loss/Adjustment  []         Advocacy/Referral                /Ethics  []         No needs expressed at               this time  []         Other: (note in               comments)  Spiritual Care Plan  []         Follow up visits with               pt/family  []         Provide materials  []         Schedule sacraments  []         Contact Community               Clergy  []         Follow up as needed  []         Other: (note in               comments)     Comments:  Paged by staff. Patient going to see her babies in NICU at this time; pastoral care will continue to follow situation and provide support as appropriate; as needed for mom and father. Mother processing news at this time. Present in NICU as mom visited babies. Mom shared she has a 9year old and a 3year old at home. Presence provided; pastoral care will remain available for support as mother may begin to process situation more fully. Father of children also arrived to visit and see babies. [phone]. Visit by: Lauryn Staton. Kel Sinha.  Gabriella Pradhan MA, [facility]    Lead  Profession Development & Advancement

## 2018-01-08 NOTE — PROGRESS NOTES
S/P rpt C/S @ 33+2wks earlier this AM.  Mono-/Mono- twins. Babies being transferred to [facility]. B has already been transferred, A to follow later tonight. Pt to be transferred this evening to be with babies.

## 2018-01-08 NOTE — ANESTHESIA POSTPROCEDURE EVALUATION
Post-Anesthesia Evaluation and Assessment    Patient: Lashell Marcelo MRN: [id number]  SSN: [id number]    YOB: 1980  Age: 44 y.o. Sex: female       Cardiovascular Function/Vital Signs  Visit Vitals    /67 (BP 1 Location: Right arm, BP Patient Position: At rest)    Pulse 93    Temp 36.3 °C (97.4 °F)    Resp 15    Ht 5' 6\" (1.676 m)    Wt 110.7 kg (244 lb)    SpO2 100%    Breastfeeding Unknown    BMI 39.38 kg/m2       Patient is status post spinal anesthesia for Procedure(s):   SECTION MULTIPLE. Nausea/Vomiting: None    Postoperative hydration reviewed and adequate. Pain:  Pain Scale 1: Numeric (0 - 10) (18 1030)  Pain Intensity 1: 2 (18 1030)   Managed    Neurological Status:   Neuro (WDL): Within Defined Limits (18 0915)   At baseline    Mental Status and Level of Consciousness: Arousable    Pulmonary Status:   O2 Device: Room air (18 0915)   Adequate oxygenation and airway patent    Complications related to anesthesia: None    Post-anesthesia assessment completed.  No concerns    Signed By: Damaris Hunt MD     2018

## 2018-01-08 NOTE — PROGRESS NOTES
Problem: Falls - Risk of  Goal: *Absence of Falls  Document Janae Fall Risk and appropriate interventions in the flowsheet.    Outcome: Progressing Towards Goal  Fall Risk Interventions:  Mobility Interventions: Patient to call before getting OOB         Medication Interventions: Patient to call before getting OOB    Elimination Interventions: Patient to call for help with toileting needs

## 2018-01-08 NOTE — PROGRESS NOTES
1/8/2018 2:02 PM   paged. 1/8/2018 2:50 PM  Called to [facility] for discussion of possible transfer of care. Per [facility] Postpartum, they do have available beds. Dr Kwame Small aware and will contact receiving MD at [facility].     1/8/2018 3:00 PM  Nursing supervisor aware of need for transfer to [facility]. Called [facility] ([phone]) No answer at this time. 1/8/2018 3:06 PM  Per Dr Kwame Small, Dr Lis Escalera will be receiving MD at [facility]. Voicemail left with Central[facility]    1/8/2018 4:48 PM  Spoke with Transport regarding need to transfer mom to [facility]. Will call back with update and ETA.

## 2018-01-08 NOTE — IP AVS SNAPSHOT
Summary of Care Report The Summary of Care report has been created to help improve care coordination. Users with access to [employer] or [address] (Web-based application) may access additional patient information including the Discharge Summary. If you are not currently a [address] user and need more information, please call the number listed below in the [place] [address] section and ask to be connected with Medical Records. Facility Information Name Address Phone [address] [facility] [phone] [phone] Patient Information Patient Name Sex  Sandor Randolph ([id number]) Female 1980 Discharge Information Admitting Provider Service Area Unit Nathan Armando MD / [address] 3w Mother Infant / [phone] Discharge Provider Discharge Date/Time Discharge Disposition Destination (none) 2018 (Pending) AHR (none) Patient Language Language ENGLISH [13] Hospital Problems as of 2018  Reviewed: 2018 10:36 AM by Jeffry Nation MD  
  
  
  
 Class Noted - Resolved Last Modified POA Active Problems Pregnancy  2017 - Present 1/10/2018 by Franca Saxena CNM Unknown Entered by Jeffry Nation MD  
  Overview Addendum 2017  2:11 PM by Jeffry Nation MD  
   - twins, mono/mono - prev C/S x2 
- h/o placenta previa - AMA. Informaseq low risk. 
- Rubella equivocal 
- ASA - MFM US () 3.8% discordance, no evidence TTT. Wkly US @26wks; 2x/wk starting @ 28-30wks - MFM US (10/17) 21+3/21+3 @ 21+3. Transv/transv. No TTT. RTO 2wks. - MFM US (17) A: 1282 (59%). B: 1217 (53%). 5.1 discordance. - del 33-34wks -  (2017). Pt notified. Instructions given. - **STEROIDS** 1 wk before delivery ___ Non-Hospital Problems as of 1/11/2018  Reviewed: 1/2/2018 10:36 AM by Jerica Zhou MD  
  
  
  
 Class Noted - Resolved Last Modified Active Problems Abnormal Pap smear of cervix  7/24/2017 - Present 7/24/2017 by Jerica Zhou MD  
  Entered by Jerica Zhou MD  
  Overview Signed 7/24/2017 11:54 AM by Jerica Zhou MD  
   Pap (7/18/17) nl cells/+HPV, neg 16/18 -> rpt 1yr You are allergic to the following No active allergies Current Discharge Medication List  
  
START taking these medications Dose & Instructions Dispensing Information Comments  
 ibuprofen 800 mg tablet Commonly known as:  MOTRIN Dose:  800 mg Take 1 Tab by mouth every eight (8) hours. Quantity:  90 Tab Refills:  3  
   
 oxyCODONE-acetaminophen 5-325 mg per tablet Commonly known as:  PERCOCET Dose:  1 Tab Take 1 Tab by mouth every four (4) hours as needed. Max Daily Amount: 6 Tabs. Quantity:  30 Tab Refills:  0 ASK your doctor about these medications Dose & Instructions Dispensing Information Comments ADVAIR DISKUS 100-50 mcg/dose diskus inhaler Generic drug:  fluticasone-salmeterol Refills:  0  
   
 albuterol 2.5 mg /3 mL (0.083 %) nebulizer solution Commonly known as:  PROVENTIL VENTOLIN Refills:  0  
   
 ASPIR-LOW 81 mg tablet Generic drug:  aspirin delayed-release Refills:  0  
   
 azithromycin 250 mg tablet Commonly known as:  Aquilino Edmund  
 take 2 tablets by mouth on day 1 then take 1 tablet on days 2 through 5 Refills:  0  
   
 cetirizine 10 mg tablet Commonly known as:  ZYRTEC Refills:  0 DEEP SEA NASAL 0.65 % nasal spray Generic drug:  sodium chloride Refills:  0  
   
 fluticasone 50 mcg/actuation nasal spray Commonly known as:  Kathleenadore Juan Refills:  0  
   
 folic acid 1 mg tablet Commonly known as:  Google Dose:  2 mg Take 2 Tabs by mouth daily. Quantity:  60 Tab Refills:  6 Nasal Decongestant (Oxymetazl) 0.05 % nasal spray Generic drug:  oxymetazoline Refills:  0  
   
 predniSONE 20 mg tablet Commonly known as:  Nicole Osmel Refills:  0 PRENATAL DHA+COMPLETE PRENATAL -300 mg-mcg-mg Cmpk Generic drug:  [id number] zakiya-folic-dha Take  by mouth. Refills:  0  
   
 SUDOGEST 30 mg tablet Generic drug:  pseudoephedrine Refills:  0 Current Immunizations Name Date Influenza Vaccine (Quad) PF 2017 MMR 2018 Tdap 2017 Surgery Information ID Date/Time Status Primary Surgeon All Procedures Location [id number] 2018 [id number] [facility] MD [place]  SECTION MULTIPLE [facility]M L&D OR Follow-up Information Follow up With Details Comments Contact Info None   None (395) Patient stated that they have no PCP Discharge Instructions  Section: What to Expect at [facility] Your Recovery A  section, or , is surgery to deliver your baby through a cut, called an incision, that the doctor makes in your lower belly and uterus. You may have some pain in your lower belly and need pain medicine for 1 to 2 weeks. You can expect some vaginal bleeding for several weeks. You will probably need about 6 weeks to fully recover. It is important to take it easy while the incision is healing. Avoid heavy lifting, strenuous activities, or exercises that strain the belly muscles while you are recovering. Ask a family member or friend for help with housework, cooking, and shopping. This care sheet gives you a general idea about how long it will take for you to recover. But each person recovers at a different pace. Follow the steps below to get better as quickly as possible. How can you care for yourself at home? Activity ? · Rest when you feel tired. Getting enough sleep will help you recover. ? · Try to walk each day. Start by walking a little more than you did the day before. Bit by bit, increase the amount you walk. Walking boosts blood flow and helps prevent pneumonia, constipation, and blood clots. ? · Avoid strenuous activities, such as bicycle riding, jogging, weightlifting, and aerobic exercise, for 6 weeks or until your doctor says it is okay. ? · Until your doctor says it is okay, do not lift anything heavier than your baby. ? · Do not do sit-ups or other exercises that strain the belly muscles for 6 weeks or until your doctor says it is okay. ? · Hold a pillow over your incision when you cough or take deep breaths. This will support your belly and decrease your pain. ? · You may shower as usual. Pat the incision dry when you are done. ? · You will have some vaginal bleeding. Wear sanitary pads. Do not douche or use tampons until your doctor says it is okay. ? · Ask your doctor when you can drive again. ? · You will probably need to take at least 6 weeks off work. It depends on the type of work you do and how you feel. ? · Ask your doctor when it is okay for you to have sex. Diet ? · You can eat your normal diet. If your stomach is upset, try bland, low-fat foods like plain rice, broiled chicken, toast, and yogurt. ? · Drink plenty of fluids (unless your doctor tells you not to). ? · You may notice that your bowel movements are not regular right after your surgery. This is common. Try to avoid constipation and straining with bowel movements. You may want to take a fiber supplement every day. If you have not had a bowel movement after a couple of days, ask your doctor about taking a mild laxative. ? · If you are breastfeeding, do not drink any alcohol. Medicines ? · Your doctor will tell you if and when you can restart your medicines. He or she will also give you instructions about taking any new medicines. ? · If you take blood thinners, such as warfarin (Coumadin), clopidogrel (Plavix), or aspirin, be sure to talk to your doctor. He or she will tell you if and when to start taking those medicines again. Make sure that you understand exactly what your doctor wants you to do. ? · Take pain medicines exactly as directed. ¨ If the doctor gave you a prescription medicine for pain, take it as prescribed. ¨ If you are not taking a prescription pain medicine, ask your doctor if you can take an over-the-counter medicine. ? · If you think your pain medicine is making you sick to your stomach: 
¨ Take your medicine after meals (unless your doctor has told you not to). ¨ Ask your doctor for a different pain medicine. ? · If your doctor prescribed antibiotics, take them as directed. Do not stop taking them just because you feel better. You need to take the full course of antibiotics. Incision care ? · If you have strips of tape on the incision, leave the tape on for a week or until it falls off.  
? · Wash the area daily with warm, soapy water, and pat it dry. Don't use hydrogen peroxide or alcohol, which can slow healing. You may cover the area with a gauze bandage if it weeps or rubs against clothing. Change the bandage every day. ? · Keep the area clean and dry. Other instructions ? · If you breastfeed your baby, you may be more comfortable while you are healing if you place the baby so that he or she is not resting on your belly. Try tucking your baby under your arm, with his or her body along the side you will be feeding on. Support your baby's upper body with your arm. With that hand you can control your baby's head to bring his or her mouth to your breast. This is sometimes called the football hold. Follow-up care is a key part of your treatment and safety.  Be sure to make and go to all appointments, and call your doctor if you are having problems. It's also a good idea to know your test results and keep a list of the medicines you take. When should you call for help? Call 911 anytime you think you may need emergency care. For example, call if: 
? · You passed out (lost consciousness). ? · You have chest pain, are short of breath, or cough up blood. ?Call your doctor now or seek immediate medical care if: 
? · You have pain that does not get better after you take pain medicine. ? · You have severe vaginal bleeding. ? · You are dizzy or lightheaded, or you feel like you may faint. ? · You have new or worse pain in your belly or pelvis. ? · You have loose stitches, or your incision comes open. ? · You have symptoms of infection, such as: 
¨ Increased pain, swelling, warmth, or redness. ¨ Red streaks leading from the incision. ¨ Pus draining from the incision. ¨ A fever. ? · You have symptoms of a blood clot in your leg (called a deep vein thrombosis), such as: 
¨ Pain in your calf, back of the knee, thigh, or groin. ¨ Redness and swelling in your leg or groin. ? Watch closely for changes in your health, and be sure to contact your doctor if: 
? · You do not get better as expected. Where can you learn more? Go to http://kassidy-yinka.info/. Enter M806 in the search box to learn more about \" Section: What to Expect at Home. \" Current as of: 2017 Content Version: 11.4 © 8118-6734 Healthwise, Incorporated. Care instructions adapted under license by [facility] (which disclaims liability or warranty for this information). If you have questions about a medical condition or this instruction, always ask your healthcare professional. [place] [address] any warranty or liability for your use of this information. [employer] Activation Thank you for requesting access to [employer].  Please follow the instructions below to securely access and download your online medical record. [facility]e allows you to send messages to your doctor, view your test results, renew your prescriptions, schedule appointments, and more. How Do I Sign Up? 1. In your internet browser, go to [email or web address] 
2. Click on the First Time User? Click Here link in the Sign In box. You will be redirect to the New Member Sign Up page. 3. Enter your B[employer]e Access Code exactly as it appears below. You will not need to use this code after youve completed the sign-up process. If you do not sign up before the expiration date, you must request a new code. [employer]e Access Code: Activation code not generated Current B[facility]e Status: Active (This is the date your Bokee access code will ) 4. Enter the last four digits of your Social Security Number (xxxx) and Date of Birth (mm/dd/yyyy) as indicated and click Submit. You will be taken to the next sign-up page. 5. Create a Bokee ID. This will be your Bokee login ID and cannot be changed, so think of one that is secure and easy to remember. 6. Create a Bokee password. You can change your password at any time. 7. Enter your Password Reset Question and Answer. This can be used at a later time if you forget your password. 8. Enter your e-mail address. You will receive e-mail notification when new information is available in [address] 9. Click Sign Up. You can now view and download portions of your medical record. 10. Click the Download Summary menu link to download a portable copy of your medical information. Additional Information If you have questions, please visit the Frequently Asked Questions section of the [employer]e website at [email or web address] Remember, [employer]e is NOT to be used for urgent needs. For medical emergencies, dial 911. Chart Review Routing History Recipient Method Report Sent By Kareen Mccauley S Milton Garcia MD  
 Phone: [phone] In [facility] CUSTOM LAB REPORT Kaity Santosh [[id number]] 7/25/2017  2:01 PM 7/18/2017 Arturo Au MD  
Phone: [phone] In [facility] CUSTOM LAB REPORT Kaity Santosh [[id number]] 8/17/2017 10:51 AM 8/8/2017

## 2018-01-08 NOTE — IP AVS SNAPSHOT
[address] 
[phone] Patient: Shantal Weinstein MRN: [id number] :1980 A check alonso indicates which time of day the medication should be taken. My Medications START taking these medications Instructions Each Dose to Equal  
 Morning Noon Evening Bedtime  
 ibuprofen 800 mg tablet Commonly known as:  MOTRIN Your last dose was: Your next dose is: Take 1 Tab by mouth every eight (8) hours. 800 mg  
    
   
   
   
  
 oxyCODONE-acetaminophen 5-325 mg per tablet Commonly known as:  PERCOCET Your last dose was: Your next dose is: Take 1 Tab by mouth every four (4) hours as needed. Max Daily Amount: 6 Tabs. 1 Tab ASK your doctor about these medications Instructions Each Dose to Equal  
 Morning Noon Evening Bedtime ADVAIR DISKUS 100-50 mcg/dose diskus inhaler Generic drug:  fluticasone-salmeterol Your last dose was: Your next dose is:    
   
   
      
   
   
   
  
 albuterol 2.5 mg /3 mL (0.083 %) nebulizer solution Commonly known as:  PROVENTIL VENTOLIN Your last dose was: Your next dose is:    
   
   
      
   
   
   
  
 ASPIR-LOW 81 mg tablet Generic drug:  aspirin delayed-release Your last dose was: Your next dose is:    
   
   
      
   
   
   
  
 azithromycin 250 mg tablet Commonly known as:  Tri Gola Your last dose was: Your next dose is:    
   
   
 take 2 tablets by mouth on day 1 then take 1 tablet on days 2 through 5  
     
   
   
   
  
 cetirizine 10 mg tablet Commonly known as:  ZYRTEC Your last dose was: Your next dose is: DEEP SEA NASAL 0.65 % nasal spray Generic drug:  sodium chloride Your last dose was: Your next dose is: fluticasone 50 mcg/actuation nasal spray Commonly known as:  Demetrius Cushing Your last dose was: Your next dose is:    
   
   
      
   
   
   
  
 folic acid 1 mg tablet Commonly known as:  Ender Your last dose was: Your next dose is: Take 2 Tabs by mouth daily. 2 mg Nasal Decongestant (Oxymetazl) 0.05 % nasal spray Generic drug:  oxymetazoline Your last dose was: Your next dose is:    
   
   
      
   
   
   
  
 predniSONE 20 mg tablet Commonly known as:  Adolfo Lux Your last dose was: Your next dose is: PRENATAL DHA+COMPLETE PRENATAL -300 mg-mcg-mg Cmpk Generic drug:  [id number] zakiya-folic-dha Your last dose was: Your next dose is: Take  by mouth. SUDOGEST 30 mg tablet Generic drug:  pseudoephedrine Your last dose was: Your next dose is:    
   
   
      
   
   
   
  
  
  
Where to Get Your Medications Information on where to get these meds will be given to you by the nurse or doctor. ! Ask your nurse or doctor about these medications  
  ibuprofen 800 mg tablet  
 oxyCODONE-acetaminophen 5-325 mg per tablet

## 2018-01-08 NOTE — H&P
History & Physical    Name: Toney Pena MRN: [id number]  SSN: [id number]    YOB: 1980  Age: 44 y.o. Sex: female        Subjective:     Estimated Date of Delivery: 18  OB History      Para Term  AB Living    4 2 1 1 1 2    SAB TAB Ectopic Molar Multiple Live Births    1     2          Ms. Tania Arnett who presents with pregnancy at 33w2d for  Section. Mono-/mono- twins. Rec'd betamethasone last wk, -1/3. Rubella equifocal.    Prenatal course has otherwise been normal.  She has had URI that has required multiple antibiotic courses. [place] like it finally cleared last week. Please see prenatal records for details. Past Medical History:   Diagnosis Date    Abnormal Pap smear of cervix     Only one abn. pap hx. Colpo performed and reports WNL. No tx required. Nl paps after.  Abnormal Pap smear of cervix 2017    Pap nl cells, +HPV but neg 16/18 -> pap/HPV 1yr.  Hx of chlamydia infection      Past Surgical History:   Procedure Laterality Date     DELIVERY ONLY      HX  SECTION      x 2    HX WISDOM TEETH EXTRACTION       Social History     Occupational History    Not on file. Social History Main Topics    Smoking status: Former Smoker     Quit date:     Smokeless tobacco: Never Used      Comment: Never used vapor or e-cigs     Alcohol use No    Drug use: No    Sexual activity: Yes     Partners: Male     Birth control/ protection: None     Family History   Problem Relation Age of Onset    Liver Disease Father        No Known Allergies  Prior to Admission medications    Medication Sig Start Date End Date Taking? Authorizing Provider   fluticasone (FLONASE) 50 mcg/actuation nasal spray  17  Yes Historical Provider   DEEP SEA NASAL 0.65 % nasal spray  17  Yes Historical Provider   ASPIR-LOW 81 mg tablet  17  Yes Historical Provider   folic acid (FOLVITE) 1 mg tablet Take 2 Tabs by mouth daily. 8/8/17  Yes Calos Mason MD   [id number] zakiya-folic-dha (PRENATAL DHA+COMPLETE PRENATAL) -763 mg-mcg-mg cmpk Take  by mouth. Yes Historical Provider   cetirizine (ZYRTEC) 10 mg tablet  12/12/17   Historical Provider   ADVAIR DISKUS 100-50 mcg/dose diskus inhaler  12/12/17   Historical Provider   NASAL DECONGESTANT, OXYMETAZL, 0.05 % nasal spray  12/12/17   Historical Provider   SUDOGEST 30 mg tablet  12/12/17   Historical Provider   azithromycin (ZITHROMAX) 250 mg tablet take 2 tablets by mouth on day 1 then take 1 tablet on days 2 through 5 12/2/17   Historical Provider   albuterol (PROVENTIL VENTOLIN) 2.5 mg /3 mL (0.083 %) nebulizer solution  10/13/17   Historical Provider   predniSONE (DELTASONE) 20 mg tablet  10/13/17   Historical Provider        Review of Systems: A comprehensive review of systems was negative except for that written in the HPI. Objective:     Vitals:  Vitals:    01/08/18 0706 01/08/18 0711 01/08/18 0716 01/08/18 0721   BP:    116/72   Pulse:    80   Resp:    14   Temp:    97.7 °F (36.5 °C)   SpO2: 100% 100% 100% 99%   Weight:       Height:            Physical Exam:  Patient without distress.   Heart: Regular rate and rhythm or S1S2 present  Lung: clear to auscultation throughout lung fields, no wheezes, no rales, no rhonchi and normal respiratory effort  Abdomen: soft, nontender  Fundus: soft and non tender  Lower Extremities:  - Edema No  Membranes:  Intact  Fetal Heart Rate: Baseline: 145/140 per minute  Variability: moderate  Accelerations: yes  Decelerations: none  Uterine contractions: none    Ltd bedside US for postion: A: cephalic, B: transverse    Prenatal Labs:   Lab Results   Component Value Date/Time    Rubella, External Equivocal  07/18/2017    GrBStrep, External Positive  12/27/2017    HBsAg, External Negative  07/18/2017    HIV, External Negative  07/18/2017    Gonorrhea, External Negative  07/18/2017    Chlamydia, External Negative  07/18/2017 Assessment/Plan:     Plan: Admit for repeat Caesarean section. Mono-/Mono- twins @ 33+2wks, S/P course of BMZ last week. Group B Strep was positive, use of prophylactic antibiotics not indicated. R/B reviewed, questions answered. Will proceed as scheduled.     Signed By:  Adarsh Ashton MD     January 8, 2018

## 2018-01-08 NOTE — ANESTHESIA PREPROCEDURE EVALUATION
Anesthetic History   No history of anesthetic complications            Review of Systems / Medical History  Patient summary reviewed, nursing notes reviewed and pertinent labs reviewed    Pulmonary  Within defined limits                 Neuro/Psych   Within defined limits           Cardiovascular  Within defined limits                Exercise tolerance: >4 METS     GI/Hepatic/Renal  Within defined limits              Endo/Other  Within defined limits      Obesity     Other Findings   Comments: Chlamydia            Physical Exam    Airway  Mallampati: II    Neck ROM: normal range of motion   Mouth opening: Normal     Cardiovascular  Regular rate and rhythm,  S1 and S2 normal,  no murmur, click, rub, or gallop  Rhythm: regular  Rate: normal         Dental  No notable dental hx       Pulmonary  Breath sounds clear to auscultation               Abdominal  GI exam deferred       Other Findings            Anesthetic Plan    ASA: 2  Anesthesia type: spinal          Induction: Intravenous  Anesthetic plan and risks discussed with: Patient

## 2018-01-09 ENCOUNTER — TELEPHONE (OUTPATIENT)
Dept: OBGYN CLINIC | Age: 38
End: 2018-01-09

## 2018-01-09 LAB
ABO + RH BLD: NORMAL
BASOPHILS # BLD: 0 K/UL (ref 0–0.1)
BASOPHILS NFR BLD: 0 % (ref 0–1)
BLOOD GROUP ANTIBODIES SERPL: NORMAL
EOSINOPHIL # BLD: 0.3 K/UL (ref 0–0.4)
EOSINOPHIL NFR BLD: 2 % (ref 0–7)
ERYTHROCYTE [DISTWIDTH] IN BLOOD BY AUTOMATED COUNT: 13.1 % (ref 11.5–14.5)
HCT VFR BLD AUTO: 27.2 % (ref 35–47)
HGB BLD-MCNC: 8.9 G/DL (ref 11.5–16)
LYMPHOCYTES # BLD: 2.2 K/UL (ref 0.8–3.5)
LYMPHOCYTES NFR BLD: 14 % (ref 12–49)
MCH RBC QN AUTO: 29.1 PG (ref 26–34)
MCHC RBC AUTO-ENTMCNC: 32.7 G/DL (ref 30–36.5)
MCV RBC AUTO: 88.9 FL (ref 80–99)
MONOCYTES # BLD: 0.9 K/UL (ref 0–1)
MONOCYTES NFR BLD: 5 % (ref 5–13)
NEUTS SEG # BLD: 12.8 K/UL (ref 1.8–8)
NEUTS SEG NFR BLD: 79 % (ref 32–75)
PLATELET # BLD AUTO: 224 K/UL (ref 150–400)
RBC # BLD AUTO: 3.06 M/UL (ref 3.8–5.2)
SPECIMEN EXP DATE BLD: NORMAL
WBC # BLD AUTO: 16.1 K/UL (ref 3.6–11)

## 2018-01-09 PROCEDURE — 74011250637 HC RX REV CODE- 250/637: Performed by: ADVANCED PRACTICE MIDWIFE

## 2018-01-09 PROCEDURE — 85025 COMPLETE CBC W/AUTO DIFF WBC: CPT | Performed by: ADVANCED PRACTICE MIDWIFE

## 2018-01-09 PROCEDURE — 65410000002 HC RM PRIVATE OB

## 2018-01-09 PROCEDURE — 36415 COLL VENOUS BLD VENIPUNCTURE: CPT | Performed by: ADVANCED PRACTICE MIDWIFE

## 2018-01-09 RX ADMIN — IBUPROFEN 800 MG: 400 TABLET, FILM COATED ORAL at 18:45

## 2018-01-09 RX ADMIN — OXYCODONE HYDROCHLORIDE AND ACETAMINOPHEN 1 TABLET: 5; 325 TABLET ORAL at 09:47

## 2018-01-09 RX ADMIN — IBUPROFEN 800 MG: 400 TABLET, FILM COATED ORAL at 01:39

## 2018-01-09 RX ADMIN — OXYCODONE HYDROCHLORIDE AND ACETAMINOPHEN 1 TABLET: 5; 325 TABLET ORAL at 15:15

## 2018-01-09 RX ADMIN — IBUPROFEN 800 MG: 400 TABLET, FILM COATED ORAL at 09:42

## 2018-01-09 RX ADMIN — OXYCODONE HYDROCHLORIDE AND ACETAMINOPHEN 1 TABLET: 5; 325 TABLET ORAL at 23:25

## 2018-01-09 RX ADMIN — OXYCODONE HYDROCHLORIDE AND ACETAMINOPHEN 1 TABLET: 5; 325 TABLET ORAL at 06:03

## 2018-01-09 RX ADMIN — OXYCODONE HYDROCHLORIDE AND ACETAMINOPHEN 1 TABLET: 5; 325 TABLET ORAL at 19:19

## 2018-01-09 RX ADMIN — DOCUSATE SODIUM 100 MG: 100 CAPSULE, LIQUID FILLED ORAL at 15:15

## 2018-01-09 NOTE — PROGRESS NOTES
S/P C/S today  Transfer from [facility]  C/S today for Mono/Mono Twins @ 33+ wks   One infant transferred initially to NICU here for supportive care and Mom and 2nd infant opted for transfer  Had clear liquid diet prior to transfer  Adequate pain control  O:  Visit Vitals    LMP 05/20/2017 (Approximate)     Pending VS from transfer  A,A&O x 3  Resting comfortably in bed without complaints  Skin pink, warm and dry  Breathing even and unlabored  Abd Soft, expected tenderness Non-distended  FF@ U/U  Dressing CD&I  Ext NT/Neg Homans/no REP    A/P: S/P C/S POD #0  Routine post-op C/S care  Lactation consult in AM  Advance diet and activity  DTV by 0200 since Young Cath removed    AKSHAT Nichols/YUNIEL

## 2018-01-09 NOTE — ROUTINE PROCESS
TRANSFER - IN REPORT:    Verbal report received from Moncho Flor RN(name) on Annita Norton  being received from [facility] Postpartum(unit) for routine progression of care      Report consisted of patients Situation, Background, Assessment and   Recommendations(SBAR). Information from the following report(s) SBAR, Kardex, Intake/Output, MAR, Accordion and Recent Results was reviewed with the receiving nurse. Opportunity for questions and clarification was provided. Assessment completed upon patients arrival to unit and care assumed. 2234: pt passed egress test, pt up to bathroom with assistance, zay care completed.      0045: talked to midwife about pt's pain medication, midwife said to start on motrin and percocet since she is tolerating PO without nausea

## 2018-01-09 NOTE — PROGRESS NOTES
Post-Operative  Day 1    Madelaine Hyde     Assessment: Post-Op day 1, stable    Plan:   1. Routine post-operative care   2. N/A    3. Continue pumping, visiting NICU    Information for the patient's :  Dalila Valdez [[id number]]   , Low Transverse  Information for the patient's :  Dannis Simmonds [[id number]]   , Low Transverse  Patient doing well without significant complaint. Nausea and vomiting resolved, tolerating po and po meds, pos flatus, kim out and pt ambulating and voiding without difficulty    Vitals:  Visit Vitals    BP 99/67 (BP 1 Location: Right arm, BP Patient Position: At rest)    Pulse 87    Temp 98.4 °F (36.9 °C)    Resp 16    LMP 2017 (Approximate)     Temp (24hrs), Av.4 °F (36.9 °C), Min:97.4 °F (36.3 °C), Max:99.1 °F (37.3 °C)      Last 24hr Input/Output:  No intake or output data in the 24 hours ending 18 0750       Exam:    A,A&O x 3                 Patient without distress. Breasts soft, NT, Pumping      Lungs CTAB      Abdomen soft, NT/ND                 BS pos X 4                 Dressing CD&I      Perineum normal lochia noted                 Lower extremities are negative for swelling, cords or tenderness.     Labs:   Lab Results   Component Value Date/Time    WBC 16.1 2018 05:57 AM    WBC 11.9 2018 06:04 AM    WBC 13.2 2017 01:45 PM    WBC 11.3 2017 11:04 AM    HGB 8.9 2018 05:57 AM    HGB 11.8 2018 06:04 AM    HGB 12.2 2017 01:45 PM    HGB 13.4 2017 11:04 AM    HCT 27.2 2018 05:57 AM    HCT 35.5 2018 06:04 AM    HCT 35.8 2017 01:45 PM    HCT 40.7 2017 11:04 AM    PLATELET 574  05:57 AM    PLATELET 071  06:04 AM    PLATELET 175  01:45 PM    PLATELET 832  11:04 AM    Hgb, External 12.2 2017    Hgb, External 13.4 2017    Hct, External 35.8 2017    Hct, External 40.7 07/18/2017    Platelet cnt., External 293 11/14/2017    Platelet cnt., External 332 07/18/2017       Recent Results (from the past 24 hour(s))   CBC WITH AUTOMATED DIFF    Collection Time: 01/09/18  5:57 AM   Result Value Ref Range    WBC 16.1 (H) 3.6 - 11.0 K/uL    RBC 3.06 (L) 3.80 - 5.20 M/uL    HGB 8.9 (L) 11.5 - 16.0 g/dL    HCT 27.2 (L) 35.0 - 47.0 %    MCV 88.9 80.0 - 99.0 FL    MCH 29.1 26.0 - 34.0 PG    MCHC 32.7 30.0 - 36.5 g/dL    RDW 13.1 11.5 - 14.5 %    PLATELET 202 481 - 769 K/uL    NEUTROPHILS 79 (H) 32 - 75 %    LYMPHOCYTES 14 12 - 49 %    MONOCYTES 5 5 - 13 %    EOSINOPHILS 2 0 - 7 %    BASOPHILS 0 0 - 1 %    ABS. NEUTROPHILS 12.8 (H) 1.8 - 8.0 K/UL    ABS. LYMPHOCYTES 2.2 0.8 - 3.5 K/UL    ABS. MONOCYTES 0.9 0.0 - 1.0 K/UL    ABS. EOSINOPHILS 0.3 0.0 - 0.4 K/UL    ABS. BASOPHILS 0.0 0.0 - 0.1 K/UL     A/P:   Remove dressing in shower  Ambulate  Lactation consult and Maternal education  Bonita Sanchez.  AKSHAT Escalante/YUNIEL

## 2018-01-09 NOTE — TELEPHONE ENCOUNTER
Call received at 847am    44year old patient   33w2d twin gestation last seen in the office yesterday. Jeaneen Prader from utilization review calling to ask for an updated order for inpatient status since patient was transferred from [address] [place] [address] Dr. Tosha Clemente to [facility] can be reached at [phone]

## 2018-01-09 NOTE — PROGRESS NOTES
1910 bedside report received from Minna Ochoa, RN in [employer] for continued care, care assumed at this time. Pt eating dinner, watching tv, denies any other needs at this time. 1945 pt requests ice, pericare done, peripad and underpad changed, pt denies any needs at this time. 2015- phone report given to MENDY Katz at [facility] for transfer. 2120 transport team arrived for transfer, vitals take, emtala forms completed, nursing supervisor notified, pt secured to stretcher. 2134 pt left via stretcher en route to [facility].

## 2018-01-10 PROCEDURE — 74011250637 HC RX REV CODE- 250/637: Performed by: ADVANCED PRACTICE MIDWIFE

## 2018-01-10 PROCEDURE — 65410000002 HC RM PRIVATE OB

## 2018-01-10 RX ADMIN — IBUPROFEN 800 MG: 400 TABLET, FILM COATED ORAL at 05:13

## 2018-01-10 RX ADMIN — OXYCODONE HYDROCHLORIDE AND ACETAMINOPHEN 1 TABLET: 5; 325 TABLET ORAL at 10:27

## 2018-01-10 RX ADMIN — IBUPROFEN 800 MG: 400 TABLET, FILM COATED ORAL at 21:41

## 2018-01-10 RX ADMIN — OXYCODONE HYDROCHLORIDE AND ACETAMINOPHEN 1 TABLET: 5; 325 TABLET ORAL at 05:13

## 2018-01-10 RX ADMIN — DOCUSATE SODIUM 100 MG: 100 CAPSULE, LIQUID FILLED ORAL at 16:32

## 2018-01-10 RX ADMIN — IBUPROFEN 800 MG: 400 TABLET, FILM COATED ORAL at 13:48

## 2018-01-10 RX ADMIN — DOCUSATE SODIUM 100 MG: 100 CAPSULE, LIQUID FILLED ORAL at 05:17

## 2018-01-10 RX ADMIN — OXYCODONE HYDROCHLORIDE AND ACETAMINOPHEN 1 TABLET: 5; 325 TABLET ORAL at 16:32

## 2018-01-10 NOTE — PROGRESS NOTES
Post-Operative  Day 2    Madelaine Hyde     Assessment: Post-Op day 2, doing well- tolerating PO and PO meds, voiding and passing flatus    Plan:   1. Routine post-operative care  2. Increase ambulation  3. Shower and remove dressing     Information for the patient's :  Nadia Sanchez [[id number]]   , Low Transverse  Information for the patient's :  Reyes Resendiz [[id number]]   , Low Transverse   Patient doing well without significant complaint. Nausea and vomiting resolved, tolerating liquids, passing flatus, voiding and ambulating without difficulty. Vitals:  Visit Vitals    /74 (BP 1 Location: Right arm, BP Patient Position: At rest)    Pulse (!) 101    Temp 98.4 °F (36.9 °C)    Resp 16    LMP 2017 (Approximate)     Temp (24hrs), Av.3 °F (36.8 °C), Min:98.2 °F (36.8 °C), Max:98.4 °F (36.9 °C)        Exam:        Patient without distress. Abdomen, bowel sounds present, soft, expected tenderness, fundus firm                Wound incision clean, dry and intact               Lower extremities are negative for swelling, cords or tenderness.     Labs:   Lab Results   Component Value Date/Time    WBC 16.1 2018 05:57 AM    WBC 11.9 2018 06:04 AM    WBC 13.2 2017 01:45 PM    WBC 11.3 2017 11:04 AM    HGB 8.9 2018 05:57 AM    HGB 11.8 2018 06:04 AM    HGB 12.2 2017 01:45 PM    HGB 13.4 2017 11:04 AM    HCT 27.2 2018 05:57 AM    HCT 35.5 2018 06:04 AM    HCT 35.8 2017 01:45 PM    HCT 40.7 2017 11:04 AM    PLATELET 076 2197 05:57 AM    PLATELET 495  06:04 AM    PLATELET 740  01:45 PM    PLATELET 497  11:04 AM    Hgb, External 12.2 2017    Hgb, External 13.4 2017    Hct, External 35.8 2017    Hct, External 40.7 2017    Platelet cnt., External 293 2017    Platelet cnt., External 332 2017 No results found for this or any previous visit (from the past 24 hour(s)).     Ashu Villar CNM

## 2018-01-10 NOTE — ROUTINE PROCESS
2000:  Bedside and Verbal shift change report given to Mich Uriarte RN  (oncoming nurse) by Demario Carlisle. Snow Kunz RN  (offgoing nurse). Report included the following information SBAR.

## 2018-01-10 NOTE — LACTATION NOTE
Pt delivered by  2 days ago to 33 week twins. Mom said she was not able to nurse her other 2 children and pumping was not successful. She would like to breast feed these babies. She has been pumping since she delivered but has not been collecting any colostrum. Mom says she has been pumping about every 3 hours. I encouraged mom to pump every 2-3 hours. I showed her hand expression and recommended that she hand express after pumping. She pumped and hand expressed at 1515 and was able to collect 0.4 cc of colostrum that I took to the NICU. Infants admitted to NICU. Pt will successfully establish breast milk supply by pumping with a hospital grade pump every 2-3 hours for approximately 20 minutes/8-10 x day with the correct size flange, and suction level for mother's comfort. To maximize milk production, mom taught to incorporate breast massage and hand expression into pumping sessions. All expressed breast milk (EBM) will be provided for infant use, in clean bottles/syringes for storage in NICU breastmilk refrigerator. Patient label with barcode,date and time applied to each container prior to transport to NICU. Proper cleaning of pump parts and good hand hygiene discussed. The breast will be offered as baby is ready; with the goal of eventual transition to breastfeeding.

## 2018-01-11 VITALS
DIASTOLIC BLOOD PRESSURE: 78 MMHG | SYSTOLIC BLOOD PRESSURE: 110 MMHG | HEART RATE: 90 BPM | TEMPERATURE: 98.4 F | RESPIRATION RATE: 16 BRPM

## 2018-01-11 PROCEDURE — 74011250637 HC RX REV CODE- 250/637: Performed by: ADVANCED PRACTICE MIDWIFE

## 2018-01-11 PROCEDURE — 74011250636 HC RX REV CODE- 250/636: Performed by: ADVANCED PRACTICE MIDWIFE

## 2018-01-11 RX ORDER — OXYCODONE AND ACETAMINOPHEN 5; 325 MG/1; MG/1
1 TABLET ORAL
Qty: 30 TAB | Refills: 0 | Status: SHIPPED | OUTPATIENT
Start: 2018-01-11

## 2018-01-11 RX ORDER — IBUPROFEN 800 MG/1
800 TABLET ORAL EVERY 8 HOURS
Qty: 90 TAB | Refills: 3 | Status: SHIPPED | OUTPATIENT
Start: 2018-01-11

## 2018-01-11 RX ADMIN — DOCUSATE SODIUM 100 MG: 100 CAPSULE, LIQUID FILLED ORAL at 12:15

## 2018-01-11 RX ADMIN — MEASLES, MUMPS, AND RUBELLA VIRUS VACCINE LIVE 0.5 ML: 1000; 12500; 1000 INJECTION, POWDER, LYOPHILIZED, FOR SUSPENSION SUBCUTANEOUS at 12:17

## 2018-01-11 RX ADMIN — IBUPROFEN 800 MG: 400 TABLET, FILM COATED ORAL at 06:58

## 2018-01-11 RX ADMIN — DOCUSATE SODIUM 100 MG: 100 CAPSULE, LIQUID FILLED ORAL at 01:18

## 2018-01-11 RX ADMIN — OXYCODONE HYDROCHLORIDE AND ACETAMINOPHEN 1 TABLET: 5; 325 TABLET ORAL at 06:58

## 2018-01-11 RX ADMIN — OXYCODONE HYDROCHLORIDE AND ACETAMINOPHEN 1 TABLET: 5; 325 TABLET ORAL at 01:14

## 2018-01-11 NOTE — LACTATION NOTE
Mom has been pumping every 2 hours. She states her breasts are getting swartz. She is noticing them getting lumpier. She has noticed a small bruise on her left breast right around 11 o'clock. She has been massaging her breasts when pumping and it appears the bruise is where she puts her thumb. At the last pumping she collected 10-11 cc's of breast milk. I gave her some milk labels to apply to the milk before taking it to the NICU. She is being discharged today. She hopes to stay in [facility] for a few more days and will continue to use our breast pump. She has a loaner pump from [facility] that she will be using when she goes home. She has no further questions for lactation.

## 2018-01-11 NOTE — PROGRESS NOTES
Post-Operative  Day 3    Madelaine Hyde       Assessment: Post-Op day 3, doing well- ICDI- tolerating ADL, Voiding, PO and PO meds, infants remain in NICU    Plan:   1. Discharge home today  2. Follow up in office in 6 weeks with delivering provider  3. Post partum activity/wound care advised, diet as tolerated  4. Discharge Medications: ibuprofen, percocet and medications prior to admission  5. Board in [employer] or [employer] if possible       Information for the patient's :  Nadia Sanchez [[id number]]   , Low Transverse  Information for the patient's :  Reyes Resendiz [[id number]]   , Low Transverse   Patient doing well without significant complaint. Tolerating diet, passing flatus, voiding and ambulating without difficulty    Vitals:  Visit Vitals    /75 (BP 1 Location: Right arm, BP Patient Position: At rest)    Pulse 95    Temp 98.2 °F (36.8 °C)    Resp 16    LMP 2017 (Approximate)     Temp (24hrs), Av.2 °F (36.8 °C), Min:98 °F (36.7 °C), Max:98.3 °F (36.8 °C)        Exam:        Patient without distress. Abdomen, bowel sounds present, soft, expected tenderness, fundus firm                Wound incision clean, dry and intact               Lower extremities are negative for swelling, cords or tenderness.     Labs:   Lab Results   Component Value Date/Time    WBC 16.1 2018 05:57 AM    WBC 11.9 2018 06:04 AM    WBC 13.2 2017 01:45 PM    WBC 11.3 2017 11:04 AM    HGB 8.9 2018 05:57 AM    HGB 11.8 2018 06:04 AM    HGB 12.2 2017 01:45 PM    HGB 13.4 2017 11:04 AM    HCT 27.2 2018 05:57 AM    HCT 35.5 2018 06:04 AM    HCT 35.8 2017 01:45 PM    HCT 40.7 2017 11:04 AM    PLATELET 122  05:57 AM    PLATELET 166  06:04 AM    PLATELET 277  01:45 PM    PLATELET 571  11:04 AM    Hgb, External 12.2 2017 Hgb, External 13.4 07/18/2017    Hct, External 35.8 11/14/2017    Hct, External 40.7 07/18/2017    Platelet cnt., External 293 11/14/2017    Platelet cnt., External 332 07/18/2017       No results found for this or any previous visit (from the past 24 hour(s)).

## 2018-01-11 NOTE — ROUTINE PROCESS
1215-Pt discharged to [facility]. Discharge instructions reviewed. Pt verbalized understanding.

## 2018-01-11 NOTE — DISCHARGE INSTRUCTIONS
Section: What to Expect at [address] [place] [address]    A  section, or , is surgery to deliver your baby through a cut, called an incision, that the doctor makes in your lower belly and uterus. You may have some pain in your lower belly and need pain medicine for 1 to 2 weeks. You can expect some vaginal bleeding for several weeks. You will probably need about 6 weeks to fully recover. It is important to take it easy while the incision is healing. Avoid heavy lifting, strenuous activities, or exercises that strain the belly muscles while you are recovering. Ask a family member or friend for help with housework, cooking, and shopping. This care sheet gives you a general idea about how long it will take for you to recover. But each person recovers at a different pace. Follow the steps below to get better as quickly as possible. How can you care for yourself at home? Activity  ? · Rest when you feel tired. Getting enough sleep will help you recover. ? · Try to walk each day. Start by walking a little more than you did the day before. Bit by bit, increase the amount you walk. Walking boosts blood flow and helps prevent pneumonia, constipation, and blood clots. ? · Avoid strenuous activities, such as bicycle riding, jogging, weightlifting, and aerobic exercise, for 6 weeks or until your doctor says it is okay. ? · Until your doctor says it is okay, do not lift anything heavier than your baby. ? · Do not do sit-ups or other exercises that strain the belly muscles for 6 weeks or until your doctor says it is okay. ? · Hold a pillow over your incision when you cough or take deep breaths. This will support your belly and decrease your pain. ? · You may shower as usual. Pat the incision dry when you are done. ? · You will have some vaginal bleeding. Wear sanitary pads. Do not douche or use tampons until your doctor says it is okay. ? · Ask your doctor when you can drive again. ? · You will probably need to take at least 6 weeks off work. It depends on the type of work you do and how you feel. ? · Ask your doctor when it is okay for you to have sex. Diet  ? · You can eat your normal diet. If your stomach is upset, try bland, low-fat foods like plain rice, broiled chicken, toast, and yogurt. ? · Drink plenty of fluids (unless your doctor tells you not to). ? · You may notice that your bowel movements are not regular right after your surgery. This is common. Try to avoid constipation and straining with bowel movements. You may want to take a fiber supplement every day. If you have not had a bowel movement after a couple of days, ask your doctor about taking a mild laxative. ? · If you are breastfeeding, do not drink any alcohol. Medicines  ? · Your doctor will tell you if and when you can restart your medicines. He or she will also give you instructions about taking any new medicines. ? · If you take blood thinners, such as warfarin (Coumadin), clopidogrel (Plavix), or aspirin, be sure to talk to your doctor. He or she will tell you if and when to start taking those medicines again. Make sure that you understand exactly what your doctor wants you to do. ? · Take pain medicines exactly as directed. ¨ If the doctor gave you a prescription medicine for pain, take it as prescribed. ¨ If you are not taking a prescription pain medicine, ask your doctor if you can take an over-the-counter medicine. ? · If you think your pain medicine is making you sick to your stomach:  ¨ Take your medicine after meals (unless your doctor has told you not to). ¨ Ask your doctor for a different pain medicine. ? · If your doctor prescribed antibiotics, take them as directed. Do not stop taking them just because you feel better. You need to take the full course of antibiotics. Incision care  ?  · If you have strips of tape on the incision, leave the tape on for a week or until it falls off.   ? · Wash the area daily with warm, soapy water, and pat it dry. Don't use hydrogen peroxide or alcohol, which can slow healing. You may cover the area with a gauze bandage if it weeps or rubs against clothing. Change the bandage every day. ? · Keep the area clean and dry. Other instructions  ? · If you breastfeed your baby, you may be more comfortable while you are healing if you place the baby so that he or she is not resting on your belly. Try tucking your baby under your arm, with his or her body along the side you will be feeding on. Support your baby's upper body with your arm. With that hand you can control your baby's head to bring his or her mouth to your breast. This is sometimes called the football hold. Follow-up care is a key part of your treatment and safety. Be sure to make and go to all appointments, and call your doctor if you are having problems. It's also a good idea to know your test results and keep a list of the medicines you take. When should you call for help? Call 911 anytime you think you may need emergency care. For example, call if:  ? · You passed out (lost consciousness). ? · You have chest pain, are short of breath, or cough up blood. ?Call your doctor now or seek immediate medical care if:  ? · You have pain that does not get better after you take pain medicine. ? · You have severe vaginal bleeding. ? · You are dizzy or lightheaded, or you feel like you may faint. ? · You have new or worse pain in your belly or pelvis. ? · You have loose stitches, or your incision comes open. ? · You have symptoms of infection, such as:  ¨ Increased pain, swelling, warmth, or redness. ¨ Red streaks leading from the incision. ¨ Pus draining from the incision. ¨ A fever. ? · You have symptoms of a blood clot in your leg (called a deep vein thrombosis), such as:  ¨ Pain in your calf, back of the knee, thigh, or groin. ¨ Redness and swelling in your leg or groin. ? Watch closely for changes in your health, and be sure to contact your doctor if:  ? · You do not get better as expected. Where can you learn more? Go to http://kassidy-yinka.info/. Enter M806 in the search box to learn more about \" Section: What to Expect at Home. \"  Current as of: 2017  Content Version: 11.4  © 2519-1287 [employer]. Care instructions adapted under license by [employer] (which disclaims liability or warranty for this information). If you have questions about a medical condition or this instruction, always ask your healthcare professional. [address] any warranty or liability for your use of this information. [employer] Activation    Thank you for requesting access to [employer]. Please follow the instructions below to securely access and download your online medical record. [employer] allows you to send messages to your doctor, view your test results, renew your prescriptions, schedule appointments, and more. How Do I Sign Up? 1. In your internet browser, go to [email or web address]  2. Click on the First Time User? Click Here link in the Sign In box. You will be redirect to the New Member Sign Up page. 3. Enter your [employer]ink Search Access Code exactly as it appears below. You will not need to use this code after youve completed the sign-up process. If you do not sign up before the expiration date, you must request a new code. [employer] Access Code: Activation code not generated  Current [employer]ink Search Status: Active (This is the date your [employer] Search access code will )    4. Enter the last four digits of your Social Security Number (xxxx) and Date of Birth (mm/dd/yyyy) as indicated and click Submit. You will be taken to the next sign-up page. 5. Create a Plink Search ID. This will be your [employer]ink Search login ID and cannot be changed, so think of one that is secure and easy to remember. 6. Create a [employer]ink Search password.  You can change your password at any time. 7. Enter your Password Reset Question and Answer. This can be used at a later time if you forget your password. 8. Enter your e-mail address. You will receive e-mail notification when new information is available in [address] 9. Click Sign Up. You can now view and download portions of your medical record. 10. Click the Download Summary menu link to download a portable copy of your medical information. Additional Information    If you have questions, please visit the Frequently Asked Questions section of the [employer] website at [email or web address] Remember, [employer] is NOT to be used for urgent needs. For medical emergencies, dial 911.

## 2018-01-11 NOTE — DISCHARGE SUMMARY
Obstetrical Discharge Summary     Name: Emeka Holder MRN: [id number]  SSN: [id number]    YOB: 1980  Age: 44 y.o. Sex: female      Admit Date: 2018    Discharge Date: 2018     Admitting Physician: Laz Melgar    Attending Physician:  Laz Melgar    * Admission Diagnoses:  twins  Pregnancy    * Discharge Diagnoses:   Information for the patient's :  Pattie Benjamin [[id number]]   Delivery of a 4 lb 10.8 oz (2.12 kg) female infant via , Low Transverse on 2018 at 8:19 AM  by . Apgars were 8 and 9. Information for the patient's :  Mendel Severe [[id number]]   Delivery of a 4 lb 5.1 oz (1.96 kg) female infant via , Low Transverse on 2018 at 8:19 AM  by . Apgars were 2 and 4. Additional Diagnoses:   Hospital Problems as of 2018  Date Reviewed: 2018          Codes Class Noted - Resolved POA    Pregnancy ICD-10-CM: Z34.90  ICD-9-CM: V22.2  2017 - Present Unknown    Overview Addendum 2017  2:11 PM by Natasha Dillon MD     - twins, mono/mono  - prev C/S x2  - h/o placenta previa  - AMA. Informaseq low risk.  - Rubella equivocal  - ASA  - MFM US () 3.8% discordance, no evidence TTT. Wkly US @26wks; 2x/wk starting @ 28-30wks  - MFM US (10/17) 21+3/21+3 @ 21+3. Transv/transv. No TTT. RTO 2wks. - MFM US (17) A: 1282 (59%). B: 1217 (53%). 5.1 discordance. - del 33-34wks   -  (2017). Pt notified. Instructions given.   - **STEROIDS** 1 wk before delivery ___                  Lab Results   Component Value Date/Time    ABO/Rh(D) A POSITIVE 2018 06:04 AM    Rubella, External Equivocal  2017    GrBStrep, External Positive  2017    ABO,Rh A Positive  2017      Immunization History   Administered Date(s) Administered    Influenza Vaccine (Quad) PF 2017    Tdap 2017       * Procedures:  section  * No surgery found *      [place]  Depression Scale  I have been able to laugh and see the funny side of things: As much as I always could  I have looked forward with enjoyment to things: As much as I ever did  I have blamed myself unnecessarily when things went wrong: No, never  I have been anxious or worried for no good reason: No, not at all  I have felt scared or panicky for no very good reason: No, not at all  Things have been getting on top of me: No, most of the time I have coped quite well  I have been so unhappy that I have had difficulty sleeping: No, not at all  I have felt sad or miserable: Not very often  I have been so unhappy that I have been crying: No, never  The thought of harming myself has occurred to me: Never  Total Score: 2    * Discharge Condition: good    * Hospital Course: Normal hospital course following the delivery. * Disposition: home or to board in hospital / hospitality house to be close to twins in NICU    Discharge Medications:   Current Discharge Medication List          * Follow-up Care/Patient Instructions:   Activity: Activity as tolerated  Diet: Regular Diet  Wound Care: Keep wound clean and dry    Follow-up Information     None           Signed By:  Jesus Pena CNM     January 11, 2018

## 2018-01-15 NOTE — DISCHARGE SUMMARY
Obstetrical Discharge Summary     Name: Emeka Holder MRN: [id number]  SSN: [id number]    YOB: 1980  Age: 44 y.o. Sex: female      Admit Date: 2018    Discharge/Transfer Date: 2018     Admitting Physician: Natasha Dillon MD     Attending Physician:  No att. providers found     Admission Diagnoses: TWINS;Pregnancy    Procedures for this admission: Procedure(s):   SECTION MULTIPLE    Discharge Diagnoses:   Information for the patient's :  Pattie Benjamin [[id number]]   Delivery of a 4 lb 10.8 oz (2.12 kg) female infant via , Low Transverse on 2018 at 8:19 AM  by . Apgars were 8 and 9. Information for the patient's :  Mendel Severe [[id number]]   Delivery of a 4 lb 5.1 oz (1.96 kg) female infant via , Low Transverse on 2018 at 8:19 AM  by . Apgars were 2 and 4. Discharge Condition: good    Discharge disposition: Transferred to [facility] Course: Pt admitted the morning of 18 for scheduled repeat C/S @ 33w2d for mono-/mono- twins. Surgery itself was uncomplicated. Twin B was noted to have significant neurologic findings and was transferred to [facility] for continued care. Pt was transferred later that same day to be with her baby. Patient Instructions:   Cannot display discharge medications since this patient is not currently admitted. .    No orders of the defined types were placed in this encounter.        Signed By:  Natasha Dillon MD     January 15, 2018

## 2018-02-20 ENCOUNTER — OFFICE VISIT (OUTPATIENT)
Dept: OBGYN CLINIC | Age: 38
End: 2018-02-20

## 2018-02-20 VITALS
DIASTOLIC BLOOD PRESSURE: 69 MMHG | HEART RATE: 76 BPM | HEIGHT: 66 IN | BODY MASS INDEX: 34.23 KG/M2 | SYSTOLIC BLOOD PRESSURE: 105 MMHG | WEIGHT: 213 LBS

## 2018-02-20 DIAGNOSIS — Z87.42 HISTORY OF MENORRHAGIA: ICD-10-CM

## 2018-02-20 RX ORDER — NORETHINDRONE ACETATE AND ETHINYL ESTRADIOL 1MG-20(21)
1 KIT ORAL DAILY
Qty: 3 PACKAGE | Refills: 1 | Status: SHIPPED | OUTPATIENT
Start: 2018-02-20

## 2018-02-20 NOTE — PATIENT INSTRUCTIONS
After Your Delivery (the Postpartum Period): Care Instructions  Your Care Instructions    Congratulations on the birth of your baby. Like pregnancy, the  period can be a time of excitement, aaron, and exhaustion. You may look at your wondrous little baby and feel happy. You may also be overwhelmed by your new sleep hours and new responsibilities. At first, babies often sleep during the days and are awake at night. They do not have a pattern or routine. They may make sudden gasps, jerk themselves awake, or look like they have crossed eyes. These are all normal, and they may even make you smile. In these first weeks after delivery, try to take good care of yourself. It may take 4 to 6 weeks to feel like yourself again, and possibly longer if you had a  birth. You will likely feel very tired for several weeks. Your days will be full of ups and downs, but lots of aaron as well. Follow-up care is a key part of your treatment and safety. Be sure to make and go to all appointments, and call your doctor if you are having problems. It's also a good idea to know your test results and keep a list of the medicines you take. How can you care for yourself at home? Take care of your body after delivery  · Use pads instead of tampons for the bloody flow that may last as long as 2 weeks. · Ease cramps with ibuprofen (Advil, Motrin). · Ease soreness of hemorrhoids and the area between your vagina and rectum with ice compresses or witch hazel pads. · Ease constipation by drinking lots of fluid and eating high-fiber foods. Ask your doctor about over-the-counter stool softeners. · Cleanse yourself with a gentle squeeze of warm water from a bottle instead of wiping with toilet paper. · Take a sitz bath in warm water several times a day. · Wear a good nursing bra. Ease sore and swollen breasts with warm, wet washcloths. · If you are not breastfeeding, use ice rather than heat for breast soreness.   · Your period may not start for several months if you are breastfeeding. You may bleed more, and longer at first, than you did before you got pregnant. · Wait until you are healed (about 4 to 6 weeks) before you have sexual intercourse. Your doctor will tell you when it is okay to have sex. · Try not to travel with your baby for 5 or 6 weeks. If you take a long car trip, make frequent stops to walk around and stretch. Avoid exhaustion  · Rest every day. Try to nap when your baby naps. · Ask another adult to be with you for a few days after delivery. · Plan for  if you have other children. · Stay flexible so you can eat at odd hours and sleep when you need to. Both you and your baby are making new schedules. · Plan small trips to get out of the house. Change can make you feel less tired. · Ask for help with housework, cooking, and shopping. Remind yourself that your job is to care for your baby. Know about help for postpartum depression  · \"Baby blues\" are common for the first 1 to 2 weeks after birth. You may cry or feel sad or irritable for no reason. · Rest whenever you can. Being tired makes it harder to handle your emotions. · Go for walks with your baby. · Talk to your partner, friends, and family about your feelings. · If your symptoms last for more than a few weeks, or if you feel very depressed, ask your doctor for help. · Postpartum depression can be treated. Support groups and counseling can help. Sometimes medicine can also help. Stay healthy  · Eat healthy foods so you have more energy, make good breast milk, and lose extra baby pounds. · If you breastfeed, avoid alcohol and drugs. Stay smoke-free. If you quit during pregnancy, congratulations. · Start daily exercise after 4 to 6 weeks, but rest when you feel tired. · Learn exercises to tone your belly. Do Kegel exercises to regain strength in your pelvic muscles. You can do these exercises while you stand or sit.   ¨ Squeeze the same muscles you would use to stop your urine. Your belly and thighs should not move. ¨ Hold the squeeze for 3 seconds, and then relax for 3 seconds. ¨ Start with 3 seconds. Then add 1 second each week until you are able to squeeze for 10 seconds. ¨ Repeat the exercise 10 to 15 times for each session. Do three or more sessions each day. · Find a class for new mothers and new babies that has an exercise time. · If you had a  birth, give yourself a bit more time before you exercise, and be careful. When should you call for help? Call 911 anytime you think you may need emergency care. For example, call if:  ? · You passed out (lost consciousness). ?Call your doctor now or seek immediate medical care if:  ? · You have severe vaginal bleeding. This means you are passing blood clots and soaking through a pad each hour for 2 or more hours. ? · You are dizzy or lightheaded, or you feel like you may faint. ? · You have a fever. ? · You have new belly pain, or your pain gets worse. ? Watch closely for changes in your health, and be sure to contact your doctor if:  ? · Your vaginal bleeding seems to be getting heavier. ? · You have new or worse vaginal discharge. ? · You feel sad, anxious, or hopeless for more than a few days. ? · You do not get better as expected. Where can you learn more? Go to http://kassidy-yinka.info/. Enter A461 in the search box to learn more about \"After Your Delivery (the Postpartum Period): Care Instructions. \"  Current as of: 2017  Content Version: 11.4  © 2271-2579 [employer]. Care instructions adapted under license by [employer] (which disclaims liability or warranty for this information). If you have questions about a medical condition or this instruction, always ask your healthcare professional. [address] any warranty or liability for your use of this information.

## 2018-02-20 NOTE — PROGRESS NOTES
Postpartum evaluation    Tatum Tomlinson is a 44 y.o. female who presents for a postpartum exam.     She is now six weeks post repeat  section on 18. Mono-/mono- twins delivered by rpt C/S @ 33+2wks. Baby A home. Baby B still in NICU at [facility] with suspected brain stem injury. Planning tracheostomy. Her baby is doing well. She has had no menses since delivery. She has had the following significant problems since her delivery: none    The patient is breast and bottle feeding. Planning on weaning soon. The patient would like to use birth control, discuss options. Has not been sexually active since delivery. H/o menorrhagia. Took OCPs many years ago. She is currently taking: PNVs only. EPDS=12    She is due for her next AE due after 17.       Visit Vitals    /69    Pulse 76    Ht 5' 6\" (1.676 m)    Wt 213 lb (96.6 kg)    LMP 2017 (Approximate)    Breastfeeding Yes    BMI 34.38 kg/m2       PHYSICAL EXAMINATION    Constitutional  · Appearance: well-nourished, well developed, alert, in no acute distress    HENT  · Head and Face: appears normal    Neck  · Inspection/Palpation: normal appearance, no masses or tenderness  · Lymph Nodes: no lymphadenopathy present  · Thyroid: gland size normal, nontender, no nodules or masses present on palpation    Breasts  · Inspection of Breasts: breasts symmetrical, no skin changes, no discharge present, nipple appearance normal, no skin retraction present  · Palpation of Breasts and Axillae: no masses present on palpation, no breast tenderness  · Axillary Lymph Nodes: no lymphadenopathy present    Gastrointestinal  · Abdominal Examination: abdomen non-tender to palpation, normal bowel sounds, no masses present  · Liver and spleen: no hepatomegaly present, spleen not palpable  · Hernias: no hernias identified    Genitourinary  · External Genitalia: normal appearance for age, no discharge present, no tenderness present, no inflammatory lesions present, no masses present, no atrophy present  · Vagina: normal vaginal vault without central or paravaginal defects, no discharge present, no inflammatory lesions present, no masses present  · Bladder: non-tender to palpation  · Urethra: appears normal  · Cervix: normal   · Uterus: normal size, shape and consistency  · Adnexa: no adnexal tenderness present, no adnexal masses present  · Perineum: perineum within normal limits, no evidence of trauma, no rashes or skin lesions present  · Anus: anus within normal limits, no hemorrhoids present  · Inguinal Lymph Nodes: no lymphadenopathy present    Skin  · General Inspection: no rash, no lesions identified    Neurologic/Psychiatric  · Mental Status:  · Orientation: grossly oriented to person, place and time  · Mood and Affect: mood normal, affect appropriate    Assessment:  Normal postpartum check  EPDS=12  H/o menorrhagia    Plan:  RTO for AE. Due 7/2018 with pap. Rx for contraception: LE 1/20. Can begin now. VTE risks reviewed. Given info on Mirena, Nexplanon. Orders Placed This Encounter    norethindrone-ethinyl estradiol (LOESTRIN FE 1/20, 28-DAY,) 1 mg-20 mcg (21)/75 mg (7) tab     Sig: Take 1 Tab by mouth daily.      Dispense:  3 Package     Refill:  1

## 2018-07-27 ENCOUNTER — OFFICE VISIT (OUTPATIENT)
Dept: OBGYN CLINIC | Age: 38
End: 2018-07-27

## 2018-07-27 VITALS
HEIGHT: 66 IN | SYSTOLIC BLOOD PRESSURE: 95 MMHG | WEIGHT: 212 LBS | BODY MASS INDEX: 34.07 KG/M2 | DIASTOLIC BLOOD PRESSURE: 72 MMHG | HEART RATE: 76 BPM

## 2018-07-27 DIAGNOSIS — Z01.419 ENCOUNTER FOR WELL WOMAN EXAM WITH ROUTINE GYNECOLOGICAL EXAM: Primary | ICD-10-CM

## 2018-07-27 DIAGNOSIS — R87.618 PAP SMEAR ABNORMALITY OF CERVIX/HUMAN PAPILLOMAVIRUS (HPV) POSITIVE: ICD-10-CM

## 2018-07-27 PROBLEM — Z34.90 PREGNANCY: Status: RESOLVED | Noted: 2017-07-24 | Resolved: 2018-07-27

## 2018-07-27 NOTE — PROGRESS NOTES
Annual exam ages 20-44    Rosa Miranda is a ,  43 y.o. female OTHER Patient's last menstrual period was 2018 (exact date). , who presents for her annual checkup. Since her last annual GYN exam about one year ago on 17, she has had the following changes in her health history: none    Mono-/mono-twins delivered 18. Daughter hospitalized with URI. Has trach and g-tube. ADDITIONAL CONCERNS:  She is having no significant problems. With regard to the Gardasil vaccine, she is older than the FDA approved age to receive it. Menstrual status:    Her periods are light, moderate in flow. She is using three to five pads or tampons per day, usually regular and last 26-30 days. She denies dysmenorrhea. She denies premenstrual symptoms. Contraception:    The current method of family planning is abstinence. Sexual history:     She  reports that she does not currently engage in sexual activity but has had male partners.; She reports using the following method of birth control/protection: None. .        Pap and Mammogram History:    Her most recent Pap smear was normal, HPV was positive, obtained 1 year(s) ago. She does have a history of abnormal paps. The patient has never had a mammogram.    Breast Cancer History/Substance Abuse: Negative / Negative     Past Medical History:   Diagnosis Date    Abnormal Pap smear of cervix     Only one abn. pap hx. Colpo performed and reports WNL. No tx required. Nl paps after.  Abnormal Pap smear of cervix 2017    Pap nl cells, +HPV but neg 16/18 -> pap/HPV 1yr.     Hx of chlamydia infection      Past Surgical History:   Procedure Laterality Date     DELIVERY ONLY      HX  SECTION      x 2    HX WISDOM TEETH EXTRACTION       OB History    Para Term  AB Living   4 3 1 2 1 4   SAB TAB Ectopic Molar Multiple Live Births   1    1 4      # Outcome Date GA Lbr Israel/2nd Weight Sex Delivery Anes PTL Lv   4A  18 33w2d  4 lb 10.8 oz (2.12 kg) F CS-LTranv SPINAL AN N JOSÉ MIGUEL   4B  18 33w2d  4 lb 5.1 oz (1.96 kg) F CS-LTranv SPINAL AN N JOSÉ MIGUEL   3 SAB 10/2013 12w0d    SAB      2 Term 13 37w0d  8 lb (3.629 kg) M CS-Unspec Spinal N JOSÉ MIGUEL      Birth Comments: Breech    1  11/02/10 36w0d  6 lb (2.722 kg) M CS-Unspec Spinal Y JOSÉ MIGUEL      Complications: Placenta previa          Current Outpatient Prescriptions   Medication Sig Dispense Refill    norethindrone-ethinyl estradiol (LOESTRIN FE , 28-DAY,) 1 mg-20 mcg (21)/75 mg (7) tab Take 1 Tab by mouth daily. 3 Package 1    ibuprofen (MOTRIN) 800 mg tablet Take 1 Tab by mouth every eight (8) hours. 90 Tab 3    oxyCODONE-acetaminophen (PERCOCET) 5-325 mg per tablet Take 1 Tab by mouth every four (4) hours as needed. Max Daily Amount: 6 Tabs. 30 Tab 0    cetirizine (ZYRTEC) 10 mg tablet       fluticasone (FLONASE) 50 mcg/actuation nasal spray       ADVAIR DISKUS 100-50 mcg/dose diskus inhaler       NASAL DECONGESTANT, OXYMETAZL, 0.05 % nasal spray       SUDOGEST 30 mg tablet       DEEP SEA NASAL 0.65 % nasal spray       azithromycin (ZITHROMAX) 250 mg tablet take 2 tablets by mouth on day 1 then take 1 tablet on days 2 through 5  0    albuterol (PROVENTIL VENTOLIN) 2.5 mg /3 mL (0.083 %) nebulizer solution       predniSONE (DELTASONE) 20 mg tablet       ASPIR-LOW 81 mg tablet       folic acid (FOLVITE) 1 mg tablet Take 2 Tabs by mouth daily. 60 Tab 6    METGAFFO17-QHTF zakiya-folic-dha (PRENATAL DHA+COMPLETE PRENATAL) -300 mg-mcg-mg cmpk Take  by mouth. Allergies: Review of patient's allergies indicates no known allergies. Social History     Social History    Marital status: SINGLE     Spouse name: N/A    Number of children: N/A    Years of education: N/A     Occupational History    Not on file.      Social History Main Topics    Smoking status: Former Smoker     Quit date:     Smokeless tobacco: Never Used      Comment: Never used vapor or e-cigs     Alcohol use No    Drug use: No    Sexual activity: Not Currently     Partners: Male     Birth control/ protection: None     Other Topics Concern    Not on file     Social History Narrative     Tobacco History:  reports that she quit smoking about 3 years ago. She has never used smokeless tobacco.  Alcohol Abuse:  reports that she does not drink alcohol. Drug Abuse:  reports that she does not use illicit drugs.     Patient Active Problem List   Diagnosis Code    Abnormal Pap smear of cervix R87.619     Family History   Problem Relation Age of Onset    Liver Disease Father        Review of Systems - History obtained from the patient  Constitutional: negative for weight loss, fever, night sweats  HEENT: negative for hearing loss, earache, congestion, snoring, sorethroat  CV: negative for chest pain, palpitations, edema  Resp: negative for cough, shortness of breath, wheezing  GI: negative for change in bowel habits, abdominal pain, black or bloody stools  : negative for frequency, dysuria, hematuria, vaginal discharge  MSK: negative for back pain, joint pain, muscle pain  Breast: negative for breast lumps, nipple discharge, galactorrhea  Skin :negative for itching, rash, hives  Neuro: negative for dizziness, headache, confusion, weakness  Psych: negative for anxiety, depression, change in mood  Heme/lymph: negative for bleeding, bruising, pallor    Physical Exam    Visit Vitals    BP 95/72    Pulse 76    Ht 5' 6\" (1.676 m)    Wt 212 lb (96.2 kg)    LMP 07/24/2018 (Exact Date)    Breastfeeding No    BMI 34.22 kg/m2       Constitutional  · Appearance: well-nourished, well developed, alert, in no acute distress    HENT  · Head and Face: appears normal    Neck  · Inspection/Palpation: normal appearance, no masses or tenderness  · Lymph Nodes: no lymphadenopathy present  · Thyroid: gland size normal, nontender, no nodules or masses present on palpation    Chest  · Respiratory Effort: breathing unlabored  · Auscultation: normal breath sounds    Cardiovascular  · Heart:  · Auscultation: regular rate and rhythm without murmur    Breasts  · Inspection of Breasts: breasts symmetrical, no skin changes, no discharge present, nipple appearance normal, no skin retraction present  · Palpation of Breasts and Axillae: no masses present on palpation, no breast tenderness  · Axillary Lymph Nodes: no lymphadenopathy present    Gastrointestinal  · Abdominal Examination: abdomen non-tender to palpation, normal bowel sounds, no masses present  · Liver and spleen: no hepatomegaly present, spleen not palpable  · Hernias: no hernias identified    Genitourinary  · External Genitalia: normal appearance for age, no discharge present, no tenderness present, no inflammatory lesions present, no masses present, no atrophy present  · Vagina: normal vaginal vault without central or paravaginal defects, no discharge present, no inflammatory lesions present, no masses present; +small/moderate amount of dark menstrual blood  · Bladder: non-tender to palpation  · Urethra: appears normal  · Cervix: normal   · Uterus: normal size, shape and consistency  · Adnexa: no adnexal tenderness present, no adnexal masses present  · Perineum: perineum within normal limits, no evidence of trauma, no rashes or skin lesions present  · Anus: anus within normal limits, no hemorrhoids present  · Inguinal Lymph Nodes: no lymphadenopathy present    Skin  · General Inspection: no rash, no lesions identified    Neurologic/Psychiatric  · Mental Status:  · Orientation: grossly oriented to person, place and time  · Mood and Affect: mood normal, affect appropriate        Assessment & Plan:  · Routine gynecologic examination. Pap/HPV done. · H/o HPV positive last year. Pap as above. · Her current medical status is satisfactory with no evidence of significant gynecologic issues.   · Counseled re: diet, exercise, healthy lifestyle  · Return for yearly wellness visits  · Pt counseled regarding co-testing for high risk HPV with pap  · Patient verbalized understanding      Orders Placed This Encounter    PAP IG, HPV AND RFX HPV 13/97,10([id number])     Scheduling Instructions:      Pap (7/18/17) nl cells/+HPV, neg 16/18 -> rpt 1yr     Order Specific Question:   Pap Source? Answer:   Cervical and Endocervical     Order Specific Question:   Total Hysterectomy? Answer:   No     Order Specific Question:   Supracervical Hysterectomy? Answer:   No     Order Specific Question:   Post Menopausal?     Answer:   No     Order Specific Question:   Hormone Therapy? Answer:   No     Order Specific Question:   IUD? Answer:   No     Order Specific Question:   Abnormal Bleeding? Answer:   No     Order Specific Question:   Pregnant     Answer:   No     Order Specific Question:   Post Partum? Answer:    No

## 2018-07-27 NOTE — PATIENT INSTRUCTIONS
[facility] Help Desk: [phone]       Well Visit, Ages 25 to 48: Care Instructions  Your Care Instructions    Physical exams can help you stay healthy. Your doctor has checked your overall health and may have suggested ways to take good care of yourself. He or she also may have recommended tests. At home, you can help prevent illness with healthy eating, regular exercise, and other steps. Follow-up care is a key part of your treatment and safety. Be sure to make and go to all appointments, and call your doctor if you are having problems. It's also a good idea to know your test results and keep a list of the medicines you take. How can you care for yourself at home? · Reach and stay at a healthy weight. This will lower your risk for many problems, such as obesity, diabetes, heart disease, and high blood pressure. · Get at least 30 minutes of physical activity on most days of the week. Walking is a good choice. You also may want to do other activities, such as running, swimming, cycling, or playing tennis or team sports. Discuss any changes in your exercise program with your doctor. · Do not smoke or allow others to smoke around you. If you need help quitting, talk to your doctor about stop-smoking programs and medicines. These can increase your chances of quitting for good. · Talk to your doctor about whether you have any risk factors for sexually transmitted infections (STIs). Having one sex partner (who does not have STIs and does not have sex with anyone else) is a good way to avoid these infections. · Use birth control if you do not want to have children at this time. Talk with your doctor about the choices available and what might be best for you. · Protect your skin from too much sun. When you're outdoors from 10 a.m. to 4 p.m., stay in the shade or cover up with clothing and a hat with a wide brim. Wear sunglasses that block UV rays.  Even when it's cloudy, put broad-spectrum sunscreen (SPF 30 or higher) on any exposed skin. · See a dentist one or two times a year for checkups and to have your teeth cleaned. · Wear a seat belt in the car. · Drink alcohol in moderation, if at all. That means no more than 2 drinks a day for men and 1 drink a day for women. Follow your doctor's advice about when to have certain tests. These tests can spot problems early. For everyone  · Cholesterol. Have the fat (cholesterol) in your blood tested after age 21. Your doctor will tell you how often to have this done based on your age, family history, or other things that can increase your risk for heart disease. · Blood pressure. Have your blood pressure checked during a routine doctor visit. Your doctor will tell you how often to check your blood pressure based on your age, your blood pressure results, and other factors. · Vision. Talk with your doctor about how often to have a glaucoma test.  · Diabetes. Ask your doctor whether you should have tests for diabetes. · Colon cancer. Have a test for colon cancer at age 48. You may have one of several tests. If you are younger than 48, you may need a test earlier if you have any risk factors. Risk factors include whether you already had a precancerous polyp removed from your colon or whether your parent, brother, sister, or child has had colon cancer. For women  · Breast exam and mammogram. Talk to your doctor about when you should have a clinical breast exam and a mammogram. Medical experts differ on whether and how often women under 50 should have these tests. Your doctor can help you decide what is right for you. · Pap test and pelvic exam. Begin Pap tests at age 24. A Pap test is the best way to find cervical cancer. The test often is part of a pelvic exam. Ask how often to have this test.  · Tests for sexually transmitted infections (STIs). Ask whether you should have tests for STIs.  You may be at risk if you have sex with more than one person, especially if your partners do not wear condoms. For men  · Tests for sexually transmitted infections (STIs). Ask whether you should have tests for STIs. You may be at risk if you have sex with more than one person, especially if you do not wear a condom. · Testicular cancer exam. Ask your doctor whether you should check your testicles regularly. · Prostate exam. Talk to your doctor about whether you should have a blood test (called a PSA test) for prostate cancer. Experts differ on whether and when men should have this test. Some experts suggest it if you are older than 39 and are -American or have a father or brother who got prostate cancer when he was younger than 72. When should you call for help? Watch closely for changes in your health, and be sure to contact your doctor if you have any problems or symptoms that concern you. Where can you learn more? Go to http://kassidy-yinka.info/. Enter P072 in the search box to learn more about \"Well Visit, Ages 25 to 48: Care Instructions. \"  Current as of: May 16, 2017  Content Version: 11.7  © 1973-3278 [employer]. Care instructions adapted under license by [employer] (which disclaims liability or warranty for this information). If you have questions about a medical condition or this instruction, always ask your healthcare professional. [address] any warranty or liability for your use of this information.

## 2018-07-31 LAB
CYTOLOGIST CVX/VAG CYTO: NORMAL
CYTOLOGY CVX/VAG DOC THIN PREP: NORMAL
CYTOLOGY HISTORY:: NORMAL
DX ICD CODE: NORMAL
HPV I/H RISK 1 DNA CVX QL PROBE+SIG AMP: NEGATIVE
Lab: NORMAL
OTHER STN SPEC: NORMAL
PATH REPORT.FINAL DX SPEC: NORMAL
STAT OF ADQ CVX/VAG CYTO-IMP: NORMAL

## 2018-08-08 NOTE — PROGRESS NOTES
Added pap to pmh, tickled for 3 yrs. Pt notified of normal pap results through [employer].

## 2022-09-22 NOTE — PATIENT INSTRUCTIONS

## (undated) DEVICE — STERILE POLYISOPRENE POWDER-FREE SURGICAL GLOVES: Brand: PROTEXIS

## (undated) DEVICE — SUTURE CHROMIC GUT SZ 1 L36IN ABSRB BRN L40MM CT 1/2 CIR 915H

## (undated) DEVICE — TOWEL,OR,DSP,ST,BLUE,STD,2/PK,40PK/CS: Brand: MEDLINE

## (undated) DEVICE — SUTURE VCRL SZ 0 L36IN ABSRB VLT L40MM CT 1/2 CIR J358H

## (undated) DEVICE — SUTURE MCRYL SZ 4-0 L27IN ABSRB UD L19MM PS-2 1/2 CIR PRIM Y426H

## (undated) DEVICE — TRAY CATH OD16FR SIL URIN M STATLOK STBL DEV SURSTP

## (undated) DEVICE — ROCKER SWITCH PENCIL HOLSTER: Brand: VALLEYLAB

## (undated) DEVICE — BULB SYRINGE, IRRIGATION WITH PROTECTIVE CAP, 60 CC, INDIVIDUALLY WRAPPED: Brand: DOVER

## (undated) DEVICE — SUTURE PLN GUT SZ 2-0 L27IN ABSRB YELLOWISH TAN L40MM CT 853H

## (undated) DEVICE — ABDOMINAL PAD: Brand: DERMACEA

## (undated) DEVICE — SUTURE PDS II SZ 0 L36IN ABSRB VLT L40MM CT 1/2 CIR Z358T

## (undated) DEVICE — SOLUTION IV 1000ML 0.9% SOD CHL

## (undated) DEVICE — SPONGE: LAP 18X18 W  200/CS: Brand: MEDICAL ACTION INDUSTRIES

## (undated) DEVICE — SOLIDIFIER FLUID 3000 CC ABSORB

## (undated) DEVICE — (D)STRIP SKN CLSR 0.5X4IN WHT --

## (undated) DEVICE — C-SECTION II-LF: Brand: MEDLINE INDUSTRIES, INC.

## (undated) DEVICE — TIP CLEANER: Brand: VALLEYLAB

## (undated) DEVICE — STAPLER SKIN SQ 30 ABSRB STPL DISP INSORB

## (undated) DEVICE — REM POLYHESIVE ADULT PATIENT RETURN ELECTRODE: Brand: VALLEYLAB

## (undated) DEVICE — (D)PREP SKN CHLRAPRP APPL 26ML -- CONVERT TO ITEM 371833

## (undated) DEVICE — 3000CC GUARDIAN II: Brand: GUARDIAN

## (undated) DEVICE — GOWN,AURORA,FABRIC-REINFORCED,X-LARGE: Brand: MEDLINE

## (undated) DEVICE — KENDALL SCD EXPRESS SLEEVES, KNEE LENGTH, MEDIUM: Brand: KENDALL SCD

## (undated) DEVICE — HANDLE LT SNAP ON ULT DURABLE LENS FOR TRUMPF ALC DISPOSABLE

## (undated) DEVICE — EXTRA LARGE, DISPOSABLE C-SECTION PROTECTOR - RETRACTOR: Brand: ALEXIS ® O C-SECTION PROTECTOR - RETRACTOR

## (undated) DEVICE — MASTISOL ADHESIVE LIQ 2/3ML

## (undated) DEVICE — BLADE ASSEMB CLP HAIR FINE --

## (undated) DEVICE — TRAY PREP DRY W/ PREM GLV 2 APPL 6 SPNG 2 UNDPD 1 OVERWRAP

## (undated) DEVICE — SUTURE MCRYL SZ 0 L36IN ABSRB UD L36MM CT-1 1/2 CIR Y946H

## (undated) DEVICE — POOLE SUCTION INSTRUMENT WITH REMOVABLE SHEATH: Brand: POOLE